# Patient Record
Sex: FEMALE | Race: WHITE | NOT HISPANIC OR LATINO
[De-identification: names, ages, dates, MRNs, and addresses within clinical notes are randomized per-mention and may not be internally consistent; named-entity substitution may affect disease eponyms.]

---

## 2017-06-08 PROBLEM — Z00.00 ENCOUNTER FOR PREVENTIVE HEALTH EXAMINATION: Status: ACTIVE | Noted: 2017-06-08

## 2017-07-18 ENCOUNTER — APPOINTMENT (OUTPATIENT)
Dept: RHEUMATOLOGY | Facility: CLINIC | Age: 72
End: 2017-07-18

## 2017-07-18 VITALS
HEART RATE: 87 BPM | DIASTOLIC BLOOD PRESSURE: 82 MMHG | WEIGHT: 202 LBS | BODY MASS INDEX: 31.71 KG/M2 | OXYGEN SATURATION: 96 % | HEIGHT: 67 IN | SYSTOLIC BLOOD PRESSURE: 138 MMHG | TEMPERATURE: 98 F | RESPIRATION RATE: 18 BRPM

## 2017-07-18 DIAGNOSIS — F17.200 NICOTINE DEPENDENCE, UNSPECIFIED, UNCOMPLICATED: ICD-10-CM

## 2017-07-18 DIAGNOSIS — Z87.442 PERSONAL HISTORY OF URINARY CALCULI: ICD-10-CM

## 2017-07-18 DIAGNOSIS — Z87.19 PERSONAL HISTORY OF OTHER DISEASES OF THE DIGESTIVE SYSTEM: ICD-10-CM

## 2017-07-18 DIAGNOSIS — Z82.49 FAMILY HISTORY OF ISCHEMIC HEART DISEASE AND OTHER DISEASES OF THE CIRCULATORY SYSTEM: ICD-10-CM

## 2017-07-18 DIAGNOSIS — Z86.39 PERSONAL HISTORY OF OTHER ENDOCRINE, NUTRITIONAL AND METABOLIC DISEASE: ICD-10-CM

## 2017-07-18 DIAGNOSIS — K13.79 OTHER LESIONS OF ORAL MUCOSA: ICD-10-CM

## 2017-07-18 DIAGNOSIS — M65.4 RADIAL STYLOID TENOSYNOVITIS [DE QUERVAIN]: ICD-10-CM

## 2017-07-18 DIAGNOSIS — Z87.39 PERSONAL HISTORY OF OTHER DISEASES OF THE MUSCULOSKELETAL SYSTEM AND CONNECTIVE TISSUE: ICD-10-CM

## 2017-07-18 DIAGNOSIS — M25.50 PAIN IN UNSPECIFIED JOINT: ICD-10-CM

## 2017-07-18 DIAGNOSIS — Z82.61 FAMILY HISTORY OF ARTHRITIS: ICD-10-CM

## 2017-08-14 ENCOUNTER — APPOINTMENT (OUTPATIENT)
Dept: INTERNAL MEDICINE | Facility: CLINIC | Age: 72
End: 2017-08-14
Payer: COMMERCIAL

## 2017-08-14 PROCEDURE — 99203 OFFICE O/P NEW LOW 30 MIN: CPT

## 2017-08-21 ENCOUNTER — APPOINTMENT (OUTPATIENT)
Dept: RHEUMATOLOGY | Facility: CLINIC | Age: 72
End: 2017-08-21

## 2017-08-28 ENCOUNTER — APPOINTMENT (OUTPATIENT)
Dept: INTERNAL MEDICINE | Facility: CLINIC | Age: 72
End: 2017-08-28
Payer: COMMERCIAL

## 2017-08-28 PROCEDURE — 99214 OFFICE O/P EST MOD 30 MIN: CPT

## 2021-01-25 ENCOUNTER — APPOINTMENT (OUTPATIENT)
Dept: SURGERY | Facility: CLINIC | Age: 76
End: 2021-01-25
Payer: COMMERCIAL

## 2021-01-25 PROCEDURE — 99213 OFFICE O/P EST LOW 20 MIN: CPT

## 2021-01-25 PROCEDURE — 99072 ADDL SUPL MATRL&STAF TM PHE: CPT

## 2021-07-08 DIAGNOSIS — K57.90 DIVERTICULOSIS OF INTESTINE, PART UNSPECIFIED, W/OUT PERFORATION OR ABSCESS W/OUT BLEEDING: ICD-10-CM

## 2021-07-08 DIAGNOSIS — Z82.49 FAMILY HISTORY OF ISCHEMIC HEART DISEASE AND OTHER DISEASES OF THE CIRCULATORY SYSTEM: ICD-10-CM

## 2021-07-08 DIAGNOSIS — Z87.19 PERSONAL HISTORY OF OTHER DISEASES OF THE DIGESTIVE SYSTEM: ICD-10-CM

## 2021-07-08 DIAGNOSIS — Z82.3 FAMILY HISTORY OF STROKE: ICD-10-CM

## 2021-07-15 ENCOUNTER — APPOINTMENT (OUTPATIENT)
Dept: SURGERY | Facility: CLINIC | Age: 76
End: 2021-07-15
Payer: COMMERCIAL

## 2021-07-15 VITALS
WEIGHT: 200 LBS | SYSTOLIC BLOOD PRESSURE: 125 MMHG | HEIGHT: 67 IN | HEART RATE: 84 BPM | TEMPERATURE: 97.2 F | OXYGEN SATURATION: 93 % | BODY MASS INDEX: 31.39 KG/M2 | DIASTOLIC BLOOD PRESSURE: 74 MMHG

## 2021-07-15 PROCEDURE — 99213 OFFICE O/P EST LOW 20 MIN: CPT

## 2021-07-15 PROCEDURE — 99072 ADDL SUPL MATRL&STAF TM PHE: CPT

## 2021-07-16 NOTE — PHYSICAL EXAM
[de-identified] : Her breasts are dense and fibrocystic.  She has no masses, skin changes, axillary or supraclavicular lymphadenopathy.  Her nipples are everted bilaterally.  Her neck is supple.\par

## 2021-07-16 NOTE — CONSULT LETTER
[Dear  ___] : Dear  [unfilled], [Sincerely,] : Sincerely, [FreeTextEntry1] : I saw Lorraine Aiken in the office today for a breast evaluation.  He is without any complaints in regards to her breasts.  She has strong family story of breast carcinoma in a maternal first cousin.  Medical and surgical history was reviewed.  Her medications were reviewed.  A review of systems was performed and documented.\par \par \par General: No acute distress, conversant, and well-nourished.  Respiratory: Lungs are clear to auscultation with good inspiratory effort.  Cardiovascular: Heart is regular rate and rhythm with no murmurs.  Abdomen: Soft and nontender.  Skin: Good color, turgor, texture with no gross lesions.  Psychiatric: Awake, alert and oriented x3 with an appropriate affect.\par \par Her breasts are dense and fibrocystic.  She has no masses, skin changes, axillary or supraclavicular lymphadenopathy.  Her nipples are everted bilaterally.  Her neck is supple.\par \par Impression: Fibrocystic breasts, family history of breast carcinoma.\par \par Plan: Is a bilateral mammogram and bilateral breast ultrasound for November of this year.  Pending normalcy of those studies and no changes in her breast have asked her to follow-up with me in 6 months for reevaluation. [FreeTextEntry3] : NATE Pan\par

## 2021-12-29 ENCOUNTER — NON-APPOINTMENT (OUTPATIENT)
Age: 76
End: 2021-12-29

## 2021-12-29 ENCOUNTER — APPOINTMENT (OUTPATIENT)
Dept: CARDIOLOGY | Facility: CLINIC | Age: 76
End: 2021-12-29
Payer: COMMERCIAL

## 2021-12-29 VITALS
DIASTOLIC BLOOD PRESSURE: 90 MMHG | OXYGEN SATURATION: 92 % | HEIGHT: 67 IN | SYSTOLIC BLOOD PRESSURE: 170 MMHG | HEART RATE: 80 BPM | WEIGHT: 238 LBS | BODY MASS INDEX: 37.35 KG/M2

## 2021-12-29 DIAGNOSIS — R06.00 DYSPNEA, UNSPECIFIED: ICD-10-CM

## 2021-12-29 DIAGNOSIS — R60.0 LOCALIZED EDEMA: ICD-10-CM

## 2021-12-29 PROCEDURE — 93000 ELECTROCARDIOGRAM COMPLETE: CPT

## 2021-12-29 PROCEDURE — 99204 OFFICE O/P NEW MOD 45 MIN: CPT

## 2021-12-29 NOTE — HISTORY OF PRESENT ILLNESS
[FreeTextEntry1] : Desirae is a 76-year-old female here for initial evaluation.\par \par She reports a history of hyperlipidemia.  She is a longtime smoker, currently smoking about 1 pack/day.\par \par Her main complaint has been lower extremity swelling.  For the last 3 to 4 months, she has noted lower extremity, worse of the right lower extremity.  She was given Lasix 20, and she did not feel like it improved the swelling, though made her urinate too much, so she has since stopped it.  She was recommended to wear compression stockings, though she has not been able to tolerate them.\par \par She has no chest pain, though does report dyspnea on exertion, which she blames on her lungs.  She does report a decrease in exercise tolerance, though is not especially active.  She does have a family history of CAD in her grandparents.  \par \par Of note, she had an echocardiogram in 4/21 which demonstrated mild LVH, trace mitral regurgitation, grade 1 diastolic dysfunction, and normal left ventricular systolic function.  She also had a carotid Doppler which demonstrated 50 to 69% stenosis of her right ICA.  She is currently not taking a statin.

## 2021-12-29 NOTE — PHYSICAL EXAM
[Well Developed] : well developed [Well Nourished] : well nourished [No Acute Distress] : no acute distress [Normal Conjunctiva] : normal conjunctiva [Normal Venous Pressure] : normal venous pressure [No Carotid Bruit] : no carotid bruit [Normal S1, S2] : normal S1, S2 [No Murmur] : no murmur [No Rub] : no rub [No Gallop] : no gallop [Good Air Entry] : good air entry [No Respiratory Distress] : no respiratory distress  [Soft] : abdomen soft [Non Tender] : non-tender [No Masses/organomegaly] : no masses/organomegaly [Normal Bowel Sounds] : normal bowel sounds [Normal Gait] : normal gait [No Cyanosis] : no cyanosis [No Clubbing] : no clubbing [No Varicosities] : no varicosities [No Rash] : no rash [No Skin Lesions] : no skin lesions [Moves all extremities] : moves all extremities [No Focal Deficits] : no focal deficits [Normal Speech] : normal speech [Alert and Oriented] : alert and oriented [Normal memory] : normal memory [de-identified] : + rhonchi on right [de-identified] : edema of her le bilaterall, worse on right

## 2021-12-29 NOTE — DISCUSSION/SUMMARY
[FreeTextEntry1] : Lorraine is a 76-year-old female here for initial evaluation.  She reports dyspnea on exertion, and lower extremity swelling, which has not improved with diuretics.  Her blood pressure is elevated initially, though improved on repeat evaluation.  Her EKG demonstrates a sinus rhythm, with poor R wave progression, though no worrisome findings.\par \par In the setting of her symptoms, we will start with a 2D echocardiogram to evaluate for structural heart disease, and a lower extremity venous Doppler.  She will have a full set of blood work as well.  I have a feeling some of her respiratory symptoms, are related to smoking.\par \par She also had a greater than 50% stenosis of her right ICA, and I will repeat a carotid Doppler.  She is currently not on a statin, though is willing to discuss restarting it after these tests.\par We discussed the importance of smoking cessation, though she is not ready to quit.  I stressed the importance of diet, exercise, weight loss, reduce her overall cardiovascular risk.  We will speak after the above testing, and arrange follow-up.

## 2022-01-03 ENCOUNTER — APPOINTMENT (OUTPATIENT)
Dept: CARDIOLOGY | Facility: CLINIC | Age: 77
End: 2022-01-03
Payer: COMMERCIAL

## 2022-01-03 PROCEDURE — 93880 EXTRACRANIAL BILAT STUDY: CPT

## 2022-01-03 PROCEDURE — 93970 EXTREMITY STUDY: CPT

## 2022-01-05 ENCOUNTER — MED ADMIN CHARGE (OUTPATIENT)
Age: 77
End: 2022-01-05

## 2022-01-05 ENCOUNTER — APPOINTMENT (OUTPATIENT)
Dept: CARDIOLOGY | Facility: CLINIC | Age: 77
End: 2022-01-05
Payer: COMMERCIAL

## 2022-01-05 VITALS
RESPIRATION RATE: 16 BRPM | OXYGEN SATURATION: 91 % | HEIGHT: 67 IN | DIASTOLIC BLOOD PRESSURE: 79 MMHG | HEART RATE: 65 BPM | SYSTOLIC BLOOD PRESSURE: 120 MMHG

## 2022-01-05 PROCEDURE — 93306 TTE W/DOPPLER COMPLETE: CPT

## 2022-01-05 RX ORDER — PERFLUTREN 6.52 MG/ML
6.52 INJECTION, SUSPENSION INTRAVENOUS
Qty: 1 | Refills: 0 | Status: COMPLETED | OUTPATIENT
Start: 2022-01-05

## 2022-01-05 RX ADMIN — PERFLUTREN MG/ML: 6.52 INJECTION, SUSPENSION INTRAVENOUS at 00:00

## 2022-02-28 ENCOUNTER — APPOINTMENT (OUTPATIENT)
Dept: SURGERY | Facility: CLINIC | Age: 77
End: 2022-02-28
Payer: COMMERCIAL

## 2022-02-28 VITALS
BODY MASS INDEX: 31.39 KG/M2 | HEART RATE: 83 BPM | SYSTOLIC BLOOD PRESSURE: 150 MMHG | HEIGHT: 67 IN | TEMPERATURE: 97.7 F | WEIGHT: 200 LBS | OXYGEN SATURATION: 94 % | DIASTOLIC BLOOD PRESSURE: 76 MMHG

## 2022-02-28 PROCEDURE — 99213 OFFICE O/P EST LOW 20 MIN: CPT

## 2022-04-13 ENCOUNTER — APPOINTMENT (OUTPATIENT)
Dept: CARDIOLOGY | Facility: CLINIC | Age: 77
End: 2022-04-13

## 2022-04-13 ENCOUNTER — APPOINTMENT (OUTPATIENT)
Dept: OBGYN | Facility: CLINIC | Age: 77
End: 2022-04-13
Payer: COMMERCIAL

## 2022-04-13 VITALS
SYSTOLIC BLOOD PRESSURE: 122 MMHG | WEIGHT: 223 LBS | HEIGHT: 67 IN | DIASTOLIC BLOOD PRESSURE: 60 MMHG | BODY MASS INDEX: 35 KG/M2

## 2022-04-13 DIAGNOSIS — Z13.820 ENCOUNTER FOR SCREENING FOR OSTEOPOROSIS: ICD-10-CM

## 2022-04-13 DIAGNOSIS — N89.8 OTHER SPECIFIED NONINFLAMMATORY DISORDERS OF VAGINA: ICD-10-CM

## 2022-04-13 PROCEDURE — 99212 OFFICE O/P EST SF 10 MIN: CPT | Mod: 25

## 2022-04-13 PROCEDURE — 99387 INIT PM E/M NEW PAT 65+ YRS: CPT

## 2022-04-13 NOTE — HISTORY OF PRESENT ILLNESS
[Patient reported mammogram was normal] : Patient reported mammogram was normal [Patient reported breast sonogram was normal] : Patient reported breast sonogram was normal [HIV test declined] : HIV test declined [Syphilis test declined] : Syphilis test declined [Gonorrhea test declined] : Gonorrhea test declined [Chlamydia test declined] : Chlamydia test declined [Trichomonas test declined] : Trichomonas test declined [HPV test declined] : HPV test declined [Hepatitis B test declined] : Hepatitis B test declined [Hepatitis C test declined] : Hepatitis C test declined [Men] : men [Vaginal] : vaginal [postmenopausal] : postmenopausal [N] : Patient does not use contraception [Y] : Positive pregnancy history [No] : none [Post-Menopause, No Sxs] : post-menopausal, currently without symptoms [Currently Active] : currently active [TextBox_4] : NEW PT HERE FOR ANNUAL EXAM  [Mammogramdate] : 10/4/21 [BreastSonogramDate] : 11/25/2020 [TextBox_31] : not sure about 4-5 year ago  [LMPDate] : 1997 [PGHxTotal] : 2 [[facility]] : 2 [[facility]Living] : 2 [TextBox_6] : 1997 [TextBox_9] : 15 [TextBox_11] : POST MENOPAUSAL [FreeTextEntry1] : 1997

## 2022-04-13 NOTE — PHYSICAL EXAM
[Chaperone Present] : A chaperone was present in the examining room during all aspects of the physical examination [FreeTextEntry1] : PH [Appropriately responsive] : appropriately responsive [Alert] : alert [No Acute Distress] : no acute distress [No Lymphadenopathy] : no lymphadenopathy [Regular Rate Rhythm] : regular rate rhythm [No Murmurs] : no murmurs [Clear to Auscultation B/L] : clear to auscultation bilaterally [Soft] : soft [Non-tender] : non-tender [Non-distended] : non-distended [No HSM] : No HSM [No Lesions] : no lesions [No Mass] : no mass [Oriented x3] : oriented x3 [Examination Of The Breasts] : a normal appearance [No Masses] : no breast masses were palpable [Labia Majora] : normal [Labia Minora] : normal [Discharge] : a  ~M vaginal discharge was present [Moderate] : moderate [White] : white [Cheesy] : cheesy [Normal] : normal [Uterine Adnexae] : normal [Declined] : Patient declined rectal exam [FreeTextEntry8] : No masses nontender bilaterally

## 2022-04-13 NOTE — PLAN
[FreeTextEntry1] : 77-year-old postmenopausal female, sexually active status post BSO with now normal GYN exam except for monilial vaginitis.  Patient will be treated with fluconazole 150 mg 1 risk-benefit analysis discussed, breast self-exam taught she will have mammogram and sonogram of the breast ordered by Dr. Tomas brought this July, will have bone density study continue vitamin D3 and multivitamin patient also recommended to have colonoscopy by Dr. Mcdaniel patient given referral.  Return in 12 months

## 2022-04-15 LAB — HPV HIGH+LOW RISK DNA PNL CVX: NOT DETECTED

## 2022-04-19 LAB — CYTOLOGY CVX/VAG DOC THIN PREP: ABNORMAL

## 2022-04-29 DIAGNOSIS — R30.0 DYSURIA: ICD-10-CM

## 2022-11-14 ENCOUNTER — APPOINTMENT (OUTPATIENT)
Dept: SURGERY | Facility: CLINIC | Age: 77
End: 2022-11-14

## 2022-11-14 VITALS
TEMPERATURE: 98 F | HEIGHT: 67 IN | HEART RATE: 87 BPM | SYSTOLIC BLOOD PRESSURE: 176 MMHG | BODY MASS INDEX: 35 KG/M2 | OXYGEN SATURATION: 98 % | WEIGHT: 223 LBS | DIASTOLIC BLOOD PRESSURE: 89 MMHG

## 2022-11-14 PROCEDURE — 99213 OFFICE O/P EST LOW 20 MIN: CPT

## 2023-05-18 ENCOUNTER — APPOINTMENT (OUTPATIENT)
Dept: SURGERY | Facility: CLINIC | Age: 78
End: 2023-05-18
Payer: COMMERCIAL

## 2023-05-18 VITALS
HEART RATE: 83 BPM | HEIGHT: 67 IN | OXYGEN SATURATION: 94 % | TEMPERATURE: 97.6 F | BODY MASS INDEX: 31.39 KG/M2 | WEIGHT: 200 LBS | SYSTOLIC BLOOD PRESSURE: 120 MMHG | DIASTOLIC BLOOD PRESSURE: 76 MMHG

## 2023-05-18 PROCEDURE — 99213 OFFICE O/P EST LOW 20 MIN: CPT

## 2023-05-19 NOTE — CONSULT LETTER
[Dear  ___] : Dear  [unfilled], [Sincerely,] : Sincerely, [DrArian  ___] : Dr. MONTOYA [FreeTextEntry1] : I saw Lorraine Aiken in the office today for a breast evaluation.  She is without any complaints in regards to her breasts.  She had a benign bilateral mammogram and bilateral breast ultrasound performed on December 7, 2022.  She has strong family story of breast carcinoma in a maternal first cousin.  Her medical and surgical history was reviewed.  Her medications were reviewed.  A review of systems was performed and documented.\par \par General: No acute distress, conversant, and well-nourished.  Respiratory: Lungs are clear to auscultation with good inspiratory effort.  Cardiovascular: Heart is regular rate and rhythm with no murmurs.  Abdomen: Soft and nontender.  Skin: Good color, turgor, texture with no gross lesions.  Psychiatric: Awake, alert and oriented x3 with an appropriate affect.\par \par Her breasts are dense and fibrocystic.  She has no masses, skin changes, axillary or supraclavicular lymphadenopathy.  Her nipples are everted bilaterally.  Her neck is supple.\par \par Impression: Fibrocystic breasts.  Family history of breast carcinoma.  Benign bilateral mammogram and bilateral breast ultrasound December 7, 2022.\par \par Plan: Bilateral mammogram and bilateral breast ultrasound in December.  Follow-up after the studies are performed. [FreeTextEntry3] : Moreno Harmon D.O., ELENI, FACS\par , Surgery [facility]\par  Surgery [facility]\par

## 2023-06-14 ENCOUNTER — APPOINTMENT (OUTPATIENT)
Dept: THORACIC SURGERY | Facility: CLINIC | Age: 78
End: 2023-06-14
Payer: COMMERCIAL

## 2023-06-14 VITALS
OXYGEN SATURATION: 94 % | HEART RATE: 78 BPM | HEIGHT: 67 IN | DIASTOLIC BLOOD PRESSURE: 76 MMHG | WEIGHT: 202 LBS | SYSTOLIC BLOOD PRESSURE: 133 MMHG | RESPIRATION RATE: 16 BRPM | BODY MASS INDEX: 31.71 KG/M2

## 2023-06-14 PROCEDURE — 99205 OFFICE O/P NEW HI 60 MIN: CPT

## 2023-06-14 RX ORDER — TORSEMIDE 10 MG/1
10 TABLET ORAL DAILY
Qty: 30 | Refills: 0 | Status: DISCONTINUED | COMMUNITY
Start: 2022-01-11 | End: 2023-06-14

## 2023-06-14 RX ORDER — CEPHALEXIN 500 MG/1
500 CAPSULE ORAL TWICE DAILY
Qty: 14 | Refills: 0 | Status: DISCONTINUED | COMMUNITY
Start: 2022-04-29 | End: 2023-06-14

## 2023-06-14 RX ORDER — FLUCONAZOLE 150 MG/1
150 TABLET ORAL
Qty: 1 | Refills: 0 | Status: DISCONTINUED | COMMUNITY
Start: 2022-04-13 | End: 2023-06-14

## 2023-06-17 NOTE — HISTORY OF PRESENT ILLNESS
[FreeTextEntry1] : Ms. MONI BENAVIDEZ, 78 year old female, Current smoker (1 PPD x 50 years); started cessation treatment June, 2023, w/ hx of HTN, diverticular disease, osteoporosis. Recent CT revealing multiple lung nodule.\par \par CT Chest on 05/16/2023 ([facility]):\par - There is a slightly lobulated irregularly shaped spiculated nodule in the apical segment of the right upper lobe on series 5 image 94, series 6 image 43. It measures 1.5 x 1.9 x 1.7 cm. \par - More superiorly in the posterior segment there is a ill-defined nodular density measuring approximately approximately 6 x 10 mm (series 5 image 67, series 6 image 59).\par - A similar appearing groundglass nodular density in the medial left apex on image 55 is noted that measures approximately 9 mm period\par - Smaller groundglass nodular density in the anterior right upper lobe on image 88 and scattered areas of moderate linear subsegmental atelectasis and parenchymal markings that are most likely inflammatory in the anterior segment right upper lobe, medial segment right middle lobe, anterior segment left upper lobe and lingula, and in the medial and anterior basilar segments of both lower lobes.\par - Some of the opacities have air bronchograms and others are associated with adjacent mild groundglass predominantly in the right middle and lower lobes. These could be reevaluated on short interval follow up to assure clearing.\par - There is diffuse mild bronchial airways wall thickening predominantly in the small airways in the upper and lower lung zones and a short segment of complete airway opacification along a right upper lobe airway is noted (for example image 87). \par - There is also partial opacification versus an endoluminal nodule in the right lower lobe lateral and posterior basilar segment airways (images 198 and 205) most likely representing additional endoluminal retained or aspirated\par secretions.\par - Mildly enlarged right axillary node measures approximately 1.5 x 1.5 cm on series 2 image 25. Smaller shotty bilateral axillary nodes are noted. \par - There are numerous shotty mediastinal nodes measuring up to approximately 8 mm short axis dimension in the precarinal and subcarinal locations. \par \par PET/CT on 06/05/2023 ([facility]):\par - There is an intensely FDG avid right upper lobe mass (SUV 9.3, 1.6 x 1.9 cm, series 3 image 79). This is suspicious for malignancy. \par - There are atelectatic changes in the anterior right upper lobe and the lingula without corresponding FDG activity. \par - There is an FDG avid right axillary lymph node (SUV 1.9, 1.5 cm, series 3 image 74), likely reactive in nature.\par \par Patient is here today for CT Sx consultation, self referred. + Dyspnea upon moderate exertion; + cough Denies worsening SOB, chest pain, hemoptysis, fever, chills, night sweats, lightheadedness or dizziness.\par \par

## 2023-06-17 NOTE — ASSESSMENT
[FreeTextEntry1] : Ms. MONI BENAVIDEZ, 78 year old female, Current smoker (1 PPD x 50 years); started cessation treatment June, 2023, w/ hx of HTN, diverticular disease, osteoporosis. Recent CT revealing multiple lung nodule.\par \par I have independently reviewed the medical records and imaging at the time of this office consultation. Intensely FDG avid right upper lobe mass w/ no other concerning areas of uptake on PET. Suspicious for an early stage primary lung cancer. Discussed surgical resection for diagnostic and therapeutic purposes. Will require a right upper lobectomy given that the lesion is greater than 2cm and more centrally located. Baseline pulmonary function reviewed and within acceptable limits. Risks, benefits and alternatives explained to patient; All questions answered and she agrees to proceed with surgery. Will obtain Brain MRI w/wo contrast to complete radiologic staging workup. Follow up in 2 weeks, via telehealth, to discuss results. In the interim, will book for Right VATS, robotic assisted right upper lobectomy at [facility]. Medical clearance required prior to procedure. Continue with smoking cessation. Discussed periop risks associated with actively smoking. \par \par Recommendations reviewed with patient during this office visit, and all questions answered; Patient instructed on the importance of follow up and verbalizes understanding.\par \par I, KERI Palacio, personally performed the evaluation and management (E/M) services for this new patient. That E/M includes conducting the initial examination, assessing all conditions, and establishing the plan of care. Today, My ACP, Nelli Holder, was here to observe my evaluation and management services for this patient to be followed going forward.\par \par \par

## 2023-06-17 NOTE — DATA REVIEWED
[FreeTextEntry1] : I have independently reviewed the following:\par CT Chest on 05/16/2023\par PET/CT on 06/05/2023

## 2023-06-17 NOTE — CONSULT LETTER
[Consult Letter:] : I had the pleasure of evaluating your patient, [unfilled]. [( Thank you for referring [unfilled] for consultation for _____ )] : Thank you for referring [unfilled] for consultation for [unfilled] [Please see my note below.] : Please see my note below. [Consult Closing:] : Thank you very much for allowing me to participate in the care of this patient.  If you have any questions, please do not hesitate to contact me. [Sincerely,] : Sincerely, [FreeTextEntry2] : self referred [FreeTextEntry3] : Matt Mario MD, FACS\par , Division of Thoracic Surgery\par [facility]\par Thoracic Surgery\par [facility]\par Department of Cardiovascular & Thoracic Surgery\par \par [facility]

## 2023-06-17 NOTE — PHYSICAL EXAM
[Restricted in physically strenuous activity but ambulatory and able to carry out work of a light or sedentary nature] : Status 1- Restricted in physically strenuous activity but ambulatory and able to carry out work of a light or sedentary nature, e.g., light house work, office work [General Appearance - Alert] : alert [General Appearance - In No Acute Distress] : in no acute distress [General Appearance - Well Nourished] : well nourished [Sclera] : the sclera and conjunctiva were normal [Extraocular Movements] : extraocular movements were intact [Outer Ear] : the ears and nose were normal in appearance [Both Tympanic Membranes Were Examined] : both tympanic membranes were normal [Hearing Threshold Finger Rub Not [place]] : hearing was normal [Neck Appearance] : the appearance of the neck was normal [Neck Cervical Mass (___cm)] : no neck mass was observed [] : no respiratory distress [Respiration, Rhythm And Depth] : normal respiratory rhythm and effort [Exaggerated Use Of Accessory Muscles For Inspiration] : no accessory muscle use [Auscultation Breath Sounds / Voice Sounds] : lungs were clear to auscultation bilaterally [Heart Rate And Rhythm] : heart rate was normal and rhythm regular [Examination Of The Chest] : the chest was normal in appearance [Diminished Respiratory Excursion] : normal chest expansion [Chest Visual Inspection Thoracic Asymmetry] : no chest asymmetry [2+] : left 2+ [Breast Appearance] : normal in appearance [Breast Palpation Mass] : no palpable masses [Bowel Sounds] : normal bowel sounds [Abdomen Soft] : soft [Abdomen Tenderness] : non-tender [Cervical Lymph Nodes Enlarged Posterior Bilaterally] : posterior cervical [Cervical Lymph Nodes Enlarged Anterior Bilaterally] : anterior cervical [Supraclavicular Lymph Nodes Enlarged Bilaterally] : supraclavicular [No CVA Tenderness] : no ~M costovertebral angle tenderness [No Spinal Tenderness] : no spinal tenderness [Involuntary Movements] : no involuntary movements were seen [No Focal Deficits] : no focal deficits [Skin Color & Pigmentation] : normal skin color and pigmentation [Oriented To Time, Place, And Person] : oriented to person, place, and time [FreeTextEntry1] : Deferred

## 2023-06-23 ENCOUNTER — NON-APPOINTMENT (OUTPATIENT)
Age: 78
End: 2023-06-23

## 2023-06-23 ENCOUNTER — APPOINTMENT (OUTPATIENT)
Dept: CARDIOLOGY | Facility: CLINIC | Age: 78
End: 2023-06-23
Payer: COMMERCIAL

## 2023-06-23 VITALS
BODY MASS INDEX: 32.96 KG/M2 | SYSTOLIC BLOOD PRESSURE: 129 MMHG | WEIGHT: 210 LBS | HEIGHT: 67 IN | HEART RATE: 76 BPM | OXYGEN SATURATION: 97 % | DIASTOLIC BLOOD PRESSURE: 73 MMHG

## 2023-06-23 DIAGNOSIS — Z01.810 ENCOUNTER FOR PREPROCEDURAL CARDIOVASCULAR EXAMINATION: ICD-10-CM

## 2023-06-23 DIAGNOSIS — I65.29 OCCLUSION AND STENOSIS OF UNSPECIFIED CAROTID ARTERY: ICD-10-CM

## 2023-06-23 DIAGNOSIS — E78.5 HYPERLIPIDEMIA, UNSPECIFIED: ICD-10-CM

## 2023-06-23 PROCEDURE — 99214 OFFICE O/P EST MOD 30 MIN: CPT | Mod: 25

## 2023-06-23 PROCEDURE — 93000 ELECTROCARDIOGRAM COMPLETE: CPT | Mod: NC

## 2023-06-29 ENCOUNTER — APPOINTMENT (OUTPATIENT)
Dept: CARDIOLOGY | Facility: CLINIC | Age: 78
End: 2023-06-29
Payer: COMMERCIAL

## 2023-06-29 PROCEDURE — 93306 TTE W/DOPPLER COMPLETE: CPT

## 2023-06-29 PROCEDURE — 93880 EXTRACRANIAL BILAT STUDY: CPT

## 2023-07-03 NOTE — HISTORY OF PRESENT ILLNESS
[FreeTextEntry1] : Desirae is a 78-year-old female here for a presurgical cardiac evaluation. She was last seen in the office by Dr Camejo on 12/29/21.\par  She is scheduled for a Right VATS, robotic assisted right upper lobectomy by Dr Matt Mario on 7/17/23 at [facility].\par She reports a history of hyperlipidemia.   She is a longtime smoker, currently smoking about 1 pack/day. She recently has been cutting down to less than a pack a day on Chantix.\par \par At her last visit, her main complaint had been lower extremity swelling which has resolved.    She was recommended to wear compression stockings, though she has not been able to tolerate them.\par \par She has no chest pain, though does report dyspnea on exertion, which she blames on her lungs.  She does report a decrease in exercise tolerance, though is not especially active.  She does have a family history of CAD in her grandparents.  \par \par Of note, she had an echocardiogram in 4/21 which demonstrated mild LVH, trace mitral regurgitation, grade 1 diastolic dysfunction, and normal left ventricular systolic function.  She also had a carotid Doppler which demonstrated 50 to 69% stenosis of her right ICA.  She is currently not taking a statin.She states she gets leg cramps from the statins. Her blood pressure today was 129/73 with a heart rate of 76.

## 2023-07-03 NOTE — PHYSICAL EXAM
[Well Developed] : well developed [Well Nourished] : well nourished [No Acute Distress] : no acute distress [Normal Conjunctiva] : normal conjunctiva [Normal Venous Pressure] : normal venous pressure [No Carotid Bruit] : no carotid bruit [Normal S1, S2] : normal S1, S2 [No Murmur] : no murmur [No Rub] : no rub [No Gallop] : no gallop [Good Air Entry] : good air entry [No Respiratory Distress] : no respiratory distress  [Soft] : abdomen soft [Non Tender] : non-tender [No Masses/organomegaly] : no masses/organomegaly [Normal Bowel Sounds] : normal bowel sounds [Normal Gait] : normal gait [No Cyanosis] : no cyanosis [No Clubbing] : no clubbing [No Varicosities] : no varicosities [No Rash] : no rash [No Skin Lesions] : no skin lesions [Moves all extremities] : moves all extremities [No Focal Deficits] : no focal deficits [Normal Speech] : normal speech [Alert and Oriented] : alert and oriented [Normal memory] : normal memory [Normal Radial B/L] : normal radial B/L [Normal PT B/L] : normal PT B/L [Normal DP B/L] : normal DP B/L [de-identified] : + rhonchi on right

## 2023-07-03 NOTE — REVIEW OF SYSTEMS
[Negative] : Heme/Lymph [Dyspnea on exertion] : not dyspnea during exertion [Lower Ext Edema] : no extremity edema [FreeTextEntry5] : mild SOB with exertion

## 2023-07-03 NOTE — DISCUSSION/SUMMARY
[EKG obtained to assist in diagnosis and management of assessed problem(s)] : EKG obtained to assist in diagnosis and management of assessed problem(s) [FreeTextEntry1] : Lorraine is a 78-year-old female here for pre surgical cardiac evaluation.\par   She reports dyspnea on exertion, noting she has been smoking cigarettes - one pack armin for 60 years.\par She di have lower extremity swelling which has resolved.   Her blood pressure today is 129/73 with a heart rate of 76 Her EKG demonstrates a sinus rhythm, with poor R wave progression, though no worrisome findings.\par  She is scheduled for a Right VATS, robotic assisted right upper lobectomy by Dr Matt Mario on 7/17/23 at [facility].\par In the setting of her upcoming surgery, I am referring the patient for an echocardiogram to reassess cardiac structural integrity, left ventricular function, valvular function and the pulmonary artery systolic pressure.  In addition, I have ordered a carotid artery Doppler test to be performed to reassess the degree of carotid atherosclerosis formation.  She will make these arrangements for the studies to be performed through our office and I will telephone her to discuss the findings once the studies have been completed.  I will also add an addendum to the bottom of this note after the studies have been performed for cardiac clearance\par  \par On the previous carotid Doppler, she also had a greater than 50% stenosis of her right ICA, and I will compare that to the Carotid Doppler I just ordered preoperatively.   She is currently not on a statin, due to leg pain while taking a statin. She is willing to try Zetia 10mg along with COQ10. I will have the lipid panel and liver profile drawn in approximately 6 weeks.\par We discussed the importance of continued smoking cessation,   and stressed the importance of diet, exercise, and weight loss, to reduce her overall cardiovascular risk.  We will speak after the above testing, and further recommendations will depend on her clinical course.\par \par ADDENDUM 7/3/23\par An echocardiogram performed June 29, 2023 revealed calcification of the mitral valve annulus.  There was mild calcification of the aortic valve leaflets.  The left ventricular systolic function is normal with an ejection fraction of 58%.  There is mild grade 1 left ventricular diastolic dysfunction.  There was normal pulmonary artery pressure noted.  Compared to the transthoracic echocardiogram performed on 1/5/2022, there have been no significant interval changes.\par A carotid Doppler performed 6/29/23 revealed mild to moderate atherosclerosis without evidence of hemodynamically significant stenosis bilaterally. Again noted, multiple right thyroid nodules. Compared to previous report performed on 1/3/22, no significant changes are noted.\par \par From the perspective of preop management, I consider her optimized from a cardiovascular perspective for her planned procedure. Routine hemodynamic monitoring is recommended.

## 2023-07-05 ENCOUNTER — APPOINTMENT (OUTPATIENT)
Dept: THORACIC SURGERY | Facility: CLINIC | Age: 78
End: 2023-07-05
Payer: COMMERCIAL

## 2023-07-05 PROCEDURE — 99443: CPT

## 2023-07-05 NOTE — REASON FOR VISIT
[Follow-Up: _____] : a [unfilled] follow-up visit [Home] : at home, [unfilled] , at the time of the visit. [Medical Office: ([facility])___] : at the medical office located in  [Verbal consent obtained from patient] : the patient, [unfilled] [Spouse] : spouse

## 2023-07-07 NOTE — DATA REVIEWED
[FreeTextEntry1] : I have independently reviewed the following:\par MRI Brain w/wo Contrast on 06/28/2023

## 2023-07-07 NOTE — HISTORY OF PRESENT ILLNESS
[FreeTextEntry1] : Ms. MONI BENAVIDEZ, 78 year old female, Current smoker (1 PPD x 50 years); started cessation treatment June, 2023, now down to 2 cigs daily. On Varenicline. Pmhx of HTN, diverticular disease, osteoporosis. Recent CT revealing multiple lung nodule.\par \par CT Chest on 05/16/2023 ([facility]):\par - There is a slightly lobulated irregularly shaped spiculated nodule in the apical segment of the right upper lobe on series 5 image 94, series 6 image 43. It measures 1.5 x 1.9 x 1.7 cm. \par - More superiorly in the posterior segment there is a ill-defined nodular density measuring approximately approximately 6 x 10 mm (series 5 image 67, series 6 image 59).\par - A similar appearing groundglass nodular density in the medial left apex on image 55 is noted that measures approximately 9 mm period\par - Smaller groundglass nodular density in the anterior right upper lobe on image 88 and scattered areas of moderate linear subsegmental atelectasis and parenchymal markings that are most likely inflammatory in the anterior segment right upper lobe, medial segment right middle lobe, anterior segment left upper lobe and lingula, and in the medial and anterior basilar segments of both lower lobes.\par - Some of the opacities have air bronchograms and others are associated with adjacent mild groundglass predominantly in the right middle and lower lobes. These could be reevaluated on short interval follow up to assure clearing.\par - There is diffuse mild bronchial airways wall thickening predominantly in the small airways in the upper and lower lung zones and a short segment of complete airway opacification along a right upper lobe airway is noted (for example image 87). \par - There is also partial opacification versus an endoluminal nodule in the right lower lobe lateral and posterior basilar segment airways (images 198 and 205) most likely representing additional endoluminal retained or aspirated\par secretions.\par - Mildly enlarged right axillary node measures approximately 1.5 x 1.5 cm on series 2 image 25. Smaller shotty bilateral axillary nodes are noted. \par - There are numerous shotty mediastinal nodes measuring up to approximately 8 mm short axis dimension in the precarinal and subcarinal locations. \par \par PET/CT on 06/05/2023 ([facility]):\par - There is an intensely FDG avid right upper lobe mass (SUV 9.3, 1.6 x 1.9 cm, series 3 image 79). This is suspicious for malignancy. \par - There are atelectatic changes in the anterior right upper lobe and the lingula without corresponding FDG activity. \par - There is an FDG avid right axillary lymph node (SUV 1.9, 1.5 cm, series 3 image 74), likely reactive in nature.\par \par Seen on 06/14/2023: Intensely FDG avid right upper lobe mass w/ no other concerning areas of uptake on PET. Suspicious for an early stage primary lung cancer. Discussed surgical resection for diagnostic and therapeutic purposes. Will require a right upper lobectomy given that the lesion is greater than 2 cm and more centrally located. She agrees to proceed with surgery. Will obtain Brain MRI w/wo contrast to complete radiologic staging workup. Follow up in 2 weeks, via telehealth, to discuss results. In the interim, will book for Right VATS, robotic assisted right upper lobectomy at [facility]. Medical clearance required prior to procedure. Continue with smoking cessation. Discussed periop risks associated with actively smoking. 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 Scheduled for surgery on 7/17. Cardiac Clearance obtained on 6/23. Today, patient denies chest pain,  hemoptysis, fever, chills, night sweats, lightheadedness or dizziness.\par \par

## 2023-07-07 NOTE — ASSESSMENT
[FreeTextEntry1] : Ms. MONI BENAVIDEZ, 78 year old female, Current smoker (1 PPD x 50 years); started cessation treatment June, 2023, now down to 2 cigs daily. On Varenicline. Pmhx of HTN, diverticular disease, osteoporosis. Recent CT revealing multiple lung nodule.\par \par She is here today for follow up,  via tele visit, to discuss results of Brain MRI. \par \par I have independently reviewed the medical records and imaging at the time of this office consultation. MRI with no evidence of metastatic disease. Additionally, patient has underwent a pre op cardiac workup and was cleared for upcoming procedure. Will proceed with Robotic Assisted Right Upper Lobectomy on 7/17. She is agreeable. Periop process further reviewed with patient. R/B/A all discussed again in detail. \par \par Recommendations reviewed with patient during this office visit, and all questions answered; Patient instructed on the importance of follow up and verbalizes understanding.\par \par I, KERI Palacio, personally performed the evaluation and management (E/M) services for this established patient. That E/M includes assessing all new/exacerbated conditions, and establishing a new plan of care. Today, My ACP, Nelli Holder, was here to observe my evaluation and management services for this patient to be followed going forward.\par

## 2023-07-07 NOTE — CONSULT LETTER
[Consult Letter:] : I had the pleasure of evaluating your patient, [unfilled]. [( Thank you for referring [unfilled] for consultation for _____ )] : Thank you for referring [unfilled] for consultation for [unfilled] [Please see my note below.] : Please see my note below. [Consult Closing:] : Thank you very much for allowing me to participate in the care of this patient.  If you have any questions, please do not hesitate to contact me. [Sincerely,] : Sincerely, [FreeTextEntry2] : self referred [FreeTextEntry3] : Matt Mario MD, FACS\par , Division of Thoracic Surgery\par [facility]\par Thoracic Surgery\par [facility]\par Department of Cardiovascular & Thoracic Surgery\par \par [facility] [employer] [facility]

## 2023-07-10 ENCOUNTER — OUTPATIENT (OUTPATIENT)
Dept: OUTPATIENT SERVICES | Facility: HOSPITAL | Age: 78
LOS: 1 days | End: 2023-07-10

## 2023-07-10 VITALS
OXYGEN SATURATION: 96 % | RESPIRATION RATE: 16 BRPM | SYSTOLIC BLOOD PRESSURE: 133 MMHG | WEIGHT: 207.9 LBS | TEMPERATURE: 98 F | DIASTOLIC BLOOD PRESSURE: 67 MMHG | HEIGHT: 66 IN | HEART RATE: 79 BPM

## 2023-07-10 DIAGNOSIS — Z90.722 ACQUIRED ABSENCE OF OVARIES, BILATERAL: Chronic | ICD-10-CM

## 2023-07-10 DIAGNOSIS — R91.1 SOLITARY PULMONARY NODULE: ICD-10-CM

## 2023-07-10 DIAGNOSIS — Z87.891 PERSONAL HISTORY OF NICOTINE DEPENDENCE: Chronic | ICD-10-CM

## 2023-07-10 DIAGNOSIS — E66.9 OBESITY, UNSPECIFIED: Chronic | ICD-10-CM

## 2023-07-10 DIAGNOSIS — Z90.49 ACQUIRED ABSENCE OF OTHER SPECIFIED PARTS OF DIGESTIVE TRACT: Chronic | ICD-10-CM

## 2023-07-10 DIAGNOSIS — D75.1 SECONDARY POLYCYTHEMIA: ICD-10-CM

## 2023-07-10 LAB
ALBUMIN SERPL ELPH-MCNC: 4.3 G/DL — SIGNIFICANT CHANGE UP (ref 3.3–5)
ALP SERPL-CCNC: 77 U/L — SIGNIFICANT CHANGE UP (ref 40–120)
ALT FLD-CCNC: 27 U/L — SIGNIFICANT CHANGE UP (ref 4–33)
ANION GAP SERPL CALC-SCNC: 13 MMOL/L — SIGNIFICANT CHANGE UP (ref 7–14)
AST SERPL-CCNC: 19 U/L — SIGNIFICANT CHANGE UP (ref 4–32)
BILIRUB SERPL-MCNC: 0.3 MG/DL — SIGNIFICANT CHANGE UP (ref 0.2–1.2)
BLD GP AB SCN SERPL QL: NEGATIVE — SIGNIFICANT CHANGE UP
BUN SERPL-MCNC: 16 MG/DL — SIGNIFICANT CHANGE UP (ref 7–23)
CALCIUM SERPL-MCNC: 9.5 MG/DL — SIGNIFICANT CHANGE UP (ref 8.4–10.5)
CHLORIDE SERPL-SCNC: 97 MMOL/L — LOW (ref 98–107)
CO2 SERPL-SCNC: 25 MMOL/L — SIGNIFICANT CHANGE UP (ref 22–31)
CREAT SERPL-MCNC: 0.57 MG/DL — SIGNIFICANT CHANGE UP (ref 0.5–1.3)
EGFR: 94 ML/MIN/1.73M2 — SIGNIFICANT CHANGE UP
GLUCOSE SERPL-MCNC: 80 MG/DL — SIGNIFICANT CHANGE UP (ref 70–99)
HCT VFR BLD CALC: 48.7 % — HIGH (ref 34.5–45)
HGB BLD-MCNC: 16.3 G/DL — HIGH (ref 11.5–15.5)
MCHC RBC-ENTMCNC: 31.3 PG — SIGNIFICANT CHANGE UP (ref 27–34)
MCHC RBC-ENTMCNC: 33.5 GM/DL — SIGNIFICANT CHANGE UP (ref 32–36)
MCV RBC AUTO: 93.7 FL — SIGNIFICANT CHANGE UP (ref 80–100)
NRBC # BLD: 0 /100 WBCS — SIGNIFICANT CHANGE UP (ref 0–0)
NRBC # FLD: 0 K/UL — SIGNIFICANT CHANGE UP (ref 0–0)
PLATELET # BLD AUTO: 279 K/UL — SIGNIFICANT CHANGE UP (ref 150–400)
POTASSIUM SERPL-MCNC: 4.7 MMOL/L — SIGNIFICANT CHANGE UP (ref 3.5–5.3)
POTASSIUM SERPL-SCNC: 4.7 MMOL/L — SIGNIFICANT CHANGE UP (ref 3.5–5.3)
PROT SERPL-MCNC: 7 G/DL — SIGNIFICANT CHANGE UP (ref 6–8.3)
RBC # BLD: 5.2 M/UL — SIGNIFICANT CHANGE UP (ref 3.8–5.2)
RBC # FLD: 11.9 % — SIGNIFICANT CHANGE UP (ref 10.3–14.5)
RH IG SCN BLD-IMP: POSITIVE — SIGNIFICANT CHANGE UP
SODIUM SERPL-SCNC: 135 MMOL/L — SIGNIFICANT CHANGE UP (ref 135–145)
WBC # BLD: 9.45 K/UL — SIGNIFICANT CHANGE UP (ref 3.8–10.5)
WBC # FLD AUTO: 9.45 K/UL — SIGNIFICANT CHANGE UP (ref 3.8–10.5)

## 2023-07-10 RX ORDER — SODIUM CHLORIDE 9 MG/ML
1000 INJECTION, SOLUTION INTRAVENOUS
Refills: 0 | Status: DISCONTINUED | OUTPATIENT
Start: 2023-07-17 | End: 2023-07-18

## 2023-07-10 NOTE — H&P PST ADULT - REASON FOR ADMISSION
How Severe Is It?: moderate Is This A New Presentation, Or A Follow-Up?: Rash Solitary pulmonary nodule

## 2023-07-10 NOTE — H&P PST ADULT - NSICDXPASTSURGICALHX_GEN_ALL_CORE_FT
PAST SURGICAL HISTORY:  H/O bilateral oophorectomy     History of cholecystectomy     Obesity     Smoking history

## 2023-07-10 NOTE — H&P PST ADULT - PROBLEM SELECTOR PLAN 1
Pt scheduled for surgery and preop instructions including instructions for taking Famotidine and for Chlorhexidine use in showering on the day of surgery, given verbally and with use of  written materials, and patient confirming understanding of such instructions using  teach back method.  CC in chart from Cardio

## 2023-07-10 NOTE — H&P PST ADULT - HISTORY OF PRESENT ILLNESS
Pt is a 78 yr old female scheduled for Right Video Assisted Thoracoscopic Surgery Robotic Assisted Right Upper Lobectomy with Dr Mario tentatively 7/17/23 -  Pt is a 78 yr old female scheduled for Right Video Assisted Thoracoscopic Surgery Robotic Assisted Right Upper Lobectomy with Dr Mario tentatively 7/17/23 - pt hx polycythemia , smoking - CT scan noted pulmonary nodule - pt denies cough or SOB

## 2023-07-10 NOTE — H&P PST ADULT - NSICDXPASTMEDICALHX_GEN_ALL_CORE_FT
PAST MEDICAL HISTORY:  Pulmonary nodule      PAST MEDICAL HISTORY:  H/O polycythemia     Obesity     Pulmonary nodule     Smoking history

## 2023-07-10 NOTE — H&P PST ADULT - NS PRO FEM REPRO PAP RESULTS
· Ultrasound showed "Large complex right hydrocele containing numerous septations  Increased vascularity in the left testicle with respect to the right, although both testicles could not be imaged on the same plane limiting direct comparison   Correlate clinically for orchitis "  · Urology saw patient 10/29/2019 and recommends no surgical intervention acutely and to manage problems with legs and get therapy first   · beta HCG, AFP and LDH within normal limits  · Supportive care  normal

## 2023-07-10 NOTE — H&P PST ADULT - RESPIRATORY AND THORAX COMMENTS
Smoking history - CT scan done for screening - PET scan done Smoking history - CT scan done for screening - PET scan done and pulmonary nodules noted - pt denies SOB or cough

## 2023-07-17 ENCOUNTER — TRANSCRIPTION ENCOUNTER (OUTPATIENT)
Age: 78
End: 2023-07-17

## 2023-07-17 ENCOUNTER — INPATIENT (INPATIENT)
Facility: HOSPITAL | Age: 78
LOS: 4 days | Discharge: HOME | End: 2023-07-22
Attending: THORACIC SURGERY (CARDIOTHORACIC VASCULAR SURGERY) | Admitting: THORACIC SURGERY (CARDIOTHORACIC VASCULAR SURGERY)
Payer: COMMERCIAL

## 2023-07-17 ENCOUNTER — APPOINTMENT (OUTPATIENT)
Dept: THORACIC SURGERY | Facility: HOSPITAL | Age: 78
End: 2023-07-17

## 2023-07-17 ENCOUNTER — RESULT REVIEW (OUTPATIENT)
Age: 78
End: 2023-07-17

## 2023-07-17 VITALS
SYSTOLIC BLOOD PRESSURE: 129 MMHG | DIASTOLIC BLOOD PRESSURE: 59 MMHG | HEART RATE: 77 BPM | TEMPERATURE: 98 F | OXYGEN SATURATION: 99 % | RESPIRATION RATE: 18 BRPM | HEIGHT: 66 IN | WEIGHT: 207.9 LBS

## 2023-07-17 DIAGNOSIS — Z87.891 PERSONAL HISTORY OF NICOTINE DEPENDENCE: Chronic | ICD-10-CM

## 2023-07-17 DIAGNOSIS — R91.1 SOLITARY PULMONARY NODULE: ICD-10-CM

## 2023-07-17 DIAGNOSIS — Z90.49 ACQUIRED ABSENCE OF OTHER SPECIFIED PARTS OF DIGESTIVE TRACT: Chronic | ICD-10-CM

## 2023-07-17 DIAGNOSIS — Z90.722 ACQUIRED ABSENCE OF OVARIES, BILATERAL: Chronic | ICD-10-CM

## 2023-07-17 DIAGNOSIS — E66.9 OBESITY, UNSPECIFIED: Chronic | ICD-10-CM

## 2023-07-17 PROCEDURE — 99232 SBSQ HOSP IP/OBS MODERATE 35: CPT

## 2023-07-17 PROCEDURE — 88309 TISSUE EXAM BY PATHOLOGIST: CPT | Mod: 26

## 2023-07-17 PROCEDURE — 88305 TISSUE EXAM BY PATHOLOGIST: CPT | Mod: 26

## 2023-07-17 PROCEDURE — 71045 X-RAY EXAM CHEST 1 VIEW: CPT | Mod: 26

## 2023-07-17 PROCEDURE — S2900 ROBOTIC SURGICAL SYSTEM: CPT | Mod: NC

## 2023-07-17 PROCEDURE — 32674 THORACOSCOPY LYMPH NODE EXC: CPT

## 2023-07-17 PROCEDURE — 32663 THORACOSCOPY W/LOBECTOMY: CPT

## 2023-07-17 DEVICE — CLIP LIGATING VESOLOCK MED/LG GRN: Type: IMPLANTABLE DEVICE | Site: RIGHT | Status: FUNCTIONAL

## 2023-07-17 DEVICE — CHEST DRAIN THORACIC ARGYLE PVC 24FR STRAIGHT: Type: IMPLANTABLE DEVICE | Site: RIGHT | Status: FUNCTIONAL

## 2023-07-17 DEVICE — LIGATING CLIPS WECK HORIZON SMALL-WIDE (RED) 24: Type: IMPLANTABLE DEVICE | Site: RIGHT | Status: FUNCTIONAL

## 2023-07-17 DEVICE — STAPLER COVIDIEN TRI-STAPLE CURVED 30MM TAN RELOAD: Type: IMPLANTABLE DEVICE | Site: RIGHT | Status: FUNCTIONAL

## 2023-07-17 DEVICE — STAPLER COVIDIEN TRI-STAPLE 45MM PURPLE RELOAD: Type: IMPLANTABLE DEVICE | Site: RIGHT | Status: FUNCTIONAL

## 2023-07-17 DEVICE — STAPLER COVIDIEN TRI-STAPLE CURVED 45MM PURPLE RELOAD: Type: IMPLANTABLE DEVICE | Site: RIGHT | Status: FUNCTIONAL

## 2023-07-17 DEVICE — STAPLER COVIDIEN TRI-STAPLE 60MM PURPLE RELOAD: Type: IMPLANTABLE DEVICE | Site: RIGHT | Status: FUNCTIONAL

## 2023-07-17 DEVICE — SURGICEL 2 X 14": Type: IMPLANTABLE DEVICE | Site: RIGHT | Status: FUNCTIONAL

## 2023-07-17 RX ORDER — ONDANSETRON 8 MG/1
4 TABLET, FILM COATED ORAL EVERY 6 HOURS
Refills: 0 | Status: DISCONTINUED | OUTPATIENT
Start: 2023-07-17 | End: 2023-07-22

## 2023-07-17 RX ORDER — ACETAMINOPHEN 500 MG
975 TABLET ORAL ONCE
Refills: 0 | Status: COMPLETED | OUTPATIENT
Start: 2023-07-17 | End: 2023-07-17

## 2023-07-17 RX ORDER — HYDROMORPHONE HYDROCHLORIDE 2 MG/ML
30 INJECTION INTRAMUSCULAR; INTRAVENOUS; SUBCUTANEOUS
Refills: 0 | Status: DISCONTINUED | OUTPATIENT
Start: 2023-07-17 | End: 2023-07-18

## 2023-07-17 RX ORDER — NALOXONE HYDROCHLORIDE 4 MG/.1ML
0.1 SPRAY NASAL
Refills: 0 | Status: DISCONTINUED | OUTPATIENT
Start: 2023-07-17 | End: 2023-07-22

## 2023-07-17 RX ORDER — HEPARIN SODIUM 5000 [USP'U]/ML
5000 INJECTION INTRAVENOUS; SUBCUTANEOUS EVERY 8 HOURS
Refills: 0 | Status: DISCONTINUED | OUTPATIENT
Start: 2023-07-17 | End: 2023-07-22

## 2023-07-17 RX ORDER — ACETAMINOPHEN 500 MG
1000 TABLET ORAL ONCE
Refills: 0 | Status: COMPLETED | OUTPATIENT
Start: 2023-07-17 | End: 2023-07-17

## 2023-07-17 RX ORDER — HEPARIN SODIUM 5000 [USP'U]/ML
5000 INJECTION INTRAVENOUS; SUBCUTANEOUS ONCE
Refills: 0 | Status: COMPLETED | OUTPATIENT
Start: 2023-07-17 | End: 2023-07-17

## 2023-07-17 RX ORDER — SODIUM CHLORIDE 9 MG/ML
4 INJECTION INTRAMUSCULAR; INTRAVENOUS; SUBCUTANEOUS EVERY 12 HOURS
Refills: 0 | Status: DISCONTINUED | OUTPATIENT
Start: 2023-07-17 | End: 2023-07-22

## 2023-07-17 RX ORDER — ALBUTEROL 90 UG/1
1 AEROSOL, METERED ORAL EVERY 6 HOURS
Refills: 0 | Status: DISCONTINUED | OUTPATIENT
Start: 2023-07-17 | End: 2023-07-22

## 2023-07-17 RX ORDER — SENNA PLUS 8.6 MG/1
2 TABLET ORAL AT BEDTIME
Refills: 0 | Status: DISCONTINUED | OUTPATIENT
Start: 2023-07-17 | End: 2023-07-22

## 2023-07-17 RX ORDER — POLYETHYLENE GLYCOL 3350 17 G/17G
17 POWDER, FOR SOLUTION ORAL DAILY
Refills: 0 | Status: DISCONTINUED | OUTPATIENT
Start: 2023-07-17 | End: 2023-07-22

## 2023-07-17 RX ADMIN — Medication 400 MILLIGRAM(S): at 16:00

## 2023-07-17 RX ADMIN — SODIUM CHLORIDE 30 MILLILITER(S): 9 INJECTION, SOLUTION INTRAVENOUS at 12:05

## 2023-07-17 RX ADMIN — Medication 975 MILLIGRAM(S): at 07:05

## 2023-07-17 RX ADMIN — Medication 1000 MILLIGRAM(S): at 23:30

## 2023-07-17 RX ADMIN — Medication 1000 MILLIGRAM(S): at 16:30

## 2023-07-17 RX ADMIN — SENNA PLUS 2 TABLET(S): 8.6 TABLET ORAL at 21:30

## 2023-07-17 RX ADMIN — ALBUTEROL 1 PUFF(S): 90 AEROSOL, METERED ORAL at 21:33

## 2023-07-17 RX ADMIN — HYDROMORPHONE HYDROCHLORIDE 30 MILLILITER(S): 2 INJECTION INTRAMUSCULAR; INTRAVENOUS; SUBCUTANEOUS at 12:06

## 2023-07-17 RX ADMIN — HEPARIN SODIUM 5000 UNIT(S): 5000 INJECTION INTRAVENOUS; SUBCUTANEOUS at 21:32

## 2023-07-17 RX ADMIN — HEPARIN SODIUM 5000 UNIT(S): 5000 INJECTION INTRAVENOUS; SUBCUTANEOUS at 14:44

## 2023-07-17 RX ADMIN — Medication 400 MILLIGRAM(S): at 23:10

## 2023-07-17 RX ADMIN — SODIUM CHLORIDE 4 MILLILITER(S): 9 INJECTION INTRAMUSCULAR; INTRAVENOUS; SUBCUTANEOUS at 21:32

## 2023-07-17 RX ADMIN — HEPARIN SODIUM 5000 UNIT(S): 5000 INJECTION INTRAVENOUS; SUBCUTANEOUS at 07:05

## 2023-07-17 RX ADMIN — HYDROMORPHONE HYDROCHLORIDE 30 MILLILITER(S): 2 INJECTION INTRAMUSCULAR; INTRAVENOUS; SUBCUTANEOUS at 19:31

## 2023-07-17 NOTE — DISCHARGE NOTE PROVIDER - NSDCFUADDAPPT_GEN_ALL_CORE_FT
Please follow up with Dr. Mario in 10-14 days, call for an appointment [phone].    Please follow up with your PCP in 2-3 weeks. Call for an appointment.

## 2023-07-17 NOTE — BRIEF OPERATIVE NOTE - COMMENTS
I, Robinson Amin PA-C provided direct first assist support to the surgeon during this surgical procedure. My involvement included positioning, prepping and draping the patient prior to surgery, robotic port placement, robotic docking, robotic instrument insertion and removal, ensuring clear visibility and exposure for the surgeon by using instruments such as retractors, suction and sponges, stapling tissues and vessels, retrieving specimens from the operative field, closing surgical incisions, undocking the robot and dressing wounds.  As well as other tasks as directed by the surgeon.

## 2023-07-17 NOTE — PATIENT PROFILE ADULT - FALL HARM RISK - RISK INTERVENTIONS
[facility]    Outpatient Physical Therapy Discharge Note            Patient: Liliana Poole    : 1969    Beginning/End Dates of Reporting Period: 22 to 22    Referring Provider: Madiha Diaz MD    Therapy Diagnosis: vestibular migraine     Client Self Report: 2 migraines since here, one was overnight. Didn't try the abortive migraines meds because it wasn't the reason why she was going to trial it. A bit of nasal congestion for last few days. No vertigo attacks. Ex are going well. Balance is about the same. Is now 6 mos post Sargent palsy, feel like that is getting better. Has ENT appt that she might not keep. Per neuro, could restart noratryptllyine if needed for HA.    Objective Measurements:  Objective Measure: sx scale  Details: 1/10 something unsettled     Goals:  Goal Identifier 1 DHI   Goal Description Pt will improve her score on the DHI (Dizziness Handicap Inventory) to 25% or less indicating a clinically meaningful improvement in self-perception of dizziness over time.   Target Date 23   Date Met      Progress (detail required for progress note): Did not reassess at last visit.     Goal Identifier 2 DVA   Goal Description Pt will improve to reading 20/40 or better on the Dynamic Visual Acuity test at a speed of 2 Hz in order to note improved gaze stability with driving and shopping   Target Date 23   Date Met      Progress (detail required for progress note): Pt able to read at 20/30 at 2 Hz so goal is met.     Plan: Discharge from therapy.    Reason for Discharge: goals met or sufficient for home, pt feels she has some tools to work on things at home and feels ready to be discharged    Equipment Issued: none    Discharge Plan: Patient to continue home program.   Assistance OOB with selected safe patient handling equipment/Assistance with ambulation/Communicate Fall Risk and Risk Factors to all staff, patient, and family/Monitor gait and stability/Reinforce activity limits and safety measures with patient and family/Sit up slowly, dangle for a short time, stand at bedside before walking/Use of alarms - bed, chair and/or voice tab/Visual Cue: Yellow wristband/Bed in lowest position, wheels locked, appropriate side rails in place/Call bell, personal items and telephone in reach/Instruct patient to call for assistance before getting out of bed or chair/Non-slip footwear when patient is out of bed/[place] to call system/Physically safe environment - no spills, clutter or unnecessary equipment/Purposeful Proactive Rounding/Room/bathroom lighting operational, light cord in reach

## 2023-07-17 NOTE — PATIENT PROFILE ADULT - LEGAL HELP
Daily Note     Today's date: 2022  Patient name: Stephen Mcghee  : 1960  MRN: [id number]  Referring provider: Carissa Nguyen,*  Dx:   Encounter Diagnosis     ICD-10-CM    1  Status post right knee replacement  Z96 651    2  Primary osteoarthritis of right knee  M17 11    3  Chronic pain of right knee  M25 561     G89 29                   Subjective: Pt reports less difficulty sitting but still cannot actively lift her leg  Objective: See treatment diary below  PROM: 4-91 deg    Assessment: Pt demonstrated improved PROM, poor quad activation  Plan: Cont  POC  Precautions:  PMHx: R hip OA, anxiety, WBAT  Dx: R TKA 22    Manuals          R knee PROM  10 min 10 min 10 min                                                Neuro Re-Ed                                                                                                        Ther Ex             Bike: rocking  nv S=9  5 min S=9  5 min         Heel slides 5"x10 5"x5 5"x10 5"x20         Quad sets 5"x5 5"x5 5"x15 5"x20         Supine GA stretch 10"x3 10"x2 10"x3 10"x3         SAQ 3"/  1x10 AAROM  1x5 AAROM  2x10          LAQ    AAROM  2x10 AAROM  3x10        STS   Foam  1x5 nv Foam  1x10                     Ther Activity                                       Gait Training             Gait training on floor  FWW  1x200 ft FWW  1x400 ft FWW  1x150 ft  SPC  1x250 ft           Step-ups/  downs  FWW  4"/  1x5 FWW  4"/  1x10 Rail  4"/  1x10         Modalities no

## 2023-07-17 NOTE — BRIEF OPERATIVE NOTE - NSICDXBRIEFPROCEDURE_GEN_ALL_CORE_FT
PROCEDURES:  Lobectomy, lung, robot-assisted, using da Renay Si 17-Jul-2023 11:51:21 FB, Robot-assisted RVATS RUL Lobectomy, MLND Rigoberto Chatman

## 2023-07-17 NOTE — DISCHARGE NOTE PROVIDER - NSDCCPTREATMENT_GEN_ALL_CORE_FT
PRINCIPAL PROCEDURE  Procedure: Lobectomy, lung, robot-assisted, using da Renay Si  Findings and Treatment: FB, Robot-assisted RVATS RUL Lobectomy, MLND

## 2023-07-17 NOTE — PATIENT PROFILE ADULT - FUNCTIONAL ASSESSMENT - DAILY ACTIVITY 1.
Group Therapy Documentation    PATIENT'S NAME: Jenae Callaway  MRN:   [id number]  :   2003  ACCT. NUMBER: [id number]  DATE OF SERVICE: 3/04/22  START TIME:  9:33 AM  END TIME: 10:38 AM  FACILITATOR(S): Kriss Ramos MSW  TOPIC: Child/Adol Group Therapy  Number of patients attending the group:  4  Group Length:  1 Hours    Summary of Group / Topics Discussed:    Emotion Regulation:  PLEASE  Group Therapy/Process Group:  Verbal Group      Group Attendance:  Attended group session    Patient's response to the group topic/interactions:  discussed personal experience with topic    Patient appeared to be Actively participating.       Client specific details:  Lena reported that her depression is at a 4, anxiety is a 7, anger 2, SIB 0 SI 2 (Able to keep self safe), Sirena 6, feeling indifferent Grateful for everyone here, goal is to get time out of the house, either with friends or family.   Lena stated that she woke up late today, she didn't sleep well, she ended up sleeping on top of her bed, she was just going to lay down for a bit, and ended up falling asleep til 3 am, and then woke up and fell asleep again and woke up again at 545 am.  She was tired, from soccer and just laid down to relax and fell asleep.  She did car pool with 4 friends and they went to [facility] for [employer].  She wants to spend time with friends during the weekend.  .         4 = No assist / stand by assistance

## 2023-07-17 NOTE — PATIENT PROFILE ADULT - FALL HARM RISK - UNIVERSAL INTERVENTIONS
Bed in lowest position, wheels locked, appropriate side rails in place/Call bell, personal items and telephone in reach/Instruct patient to call for assistance before getting out of bed or chair/Non-slip footwear when patient is out of bed/[place] to call system/Physically safe environment - no spills, clutter or unnecessary equipment/Purposeful Proactive Rounding/Room/bathroom lighting operational, light cord in reach

## 2023-07-17 NOTE — DISCHARGE NOTE PROVIDER - CARE PROVIDER_API CALL
Matt Mario Moreno  Thoracic Surgery  [id number] [place] [id number]  Phone: [phone]  Fax: [phone]  Follow Up Time:

## 2023-07-17 NOTE — DISCHARGE NOTE PROVIDER - NSDCMRMEDTOKEN_GEN_ALL_CORE_FT
Chantix 1 mg oral tablet: 1 orally once a day   Chantix 1 mg oral tablet: 1 orally once a day  polyethylene glycol 3350 oral powder for reconstitution: 17 gram(s) orally once a day  senna leaf extract oral tablet: 2 tab(s) orally once a day (at bedtime)   acetaminophen 325 mg oral tablet: 2 tab(s) orally every 6 hours As needed Mild Pain (1 - 3)  Chantix 1 mg oral tablet: 1 orally once a day  Dilaudid 2 mg oral tablet: 1 tab(s) orally every 4 hours Can take 0.5 to 1 pill for moderate to severe pain MDD: 6  PA/Lat Chest Xray: PA/Lat Chest Xray  Dx: s/p Robotic RVATS, RULobectomy, MLND  ICD10: R91.8  Please fax results to Dr. Mario [phone]  polyethylene glycol 335 oral powder for reconstitution: 17 gram(s) orally once a day  senna leaf extract oral tablet: 2 tab(s) orally once a day (at bedtime)

## 2023-07-17 NOTE — DISCHARGE NOTE PROVIDER - HOSPITAL COURSE
77 yo w/ history of former smoker, polycythemia, cholecystectomy, pulmonary nodule. On 7/17/23 s/p FB, Robot-assisted RVATS RUL Lobectomy, MLND. Chest tube was removed and post xray reviewed. Patient is stable for discharge with out-patient follow up. 77 yo w/ history of former smoker, polycythemia, cholecystectomy, pulmonary nodule. On 7/17/23 s/p FB, Robot-assisted RVATS RUL Lobectomy, MLND. Patient's post-op course was c/b an air leak and failed water seal trial twice. After passing water seal trial on POD5, her chest tube was clamped. Once clamp trial was passed and f/u CXR was stable. Her chest tube was removed and post xray reviewed. At this point, she was deemed stable for discharge with out-patient follow up instructions per Dr. Mario.

## 2023-07-17 NOTE — DISCHARGE NOTE PROVIDER - NSDCFUADDINST_GEN_ALL_CORE_FT
Please continue to walk daily, increasing as tolerated, practice deep breathing and coughing. You will have one blue stitch left in place which will be removed by Dr. Mario at your follow up appointment. Please shower daily, allow water and soap to run over your incisions site, do not scrub. Pat to dry and leave open to air. Please monitor your surgical sites for increased swelling, pain, redness, discharge or warmth from the area. If these symptoms or new SOB, chest pain, fevers or chills occur, please call the office, [phone].

## 2023-07-18 LAB
ANION GAP SERPL CALC-SCNC: 12 MMOL/L — SIGNIFICANT CHANGE UP (ref 7–14)
BASOPHILS # BLD AUTO: 0.02 K/UL — SIGNIFICANT CHANGE UP (ref 0–0.2)
BASOPHILS NFR BLD AUTO: 0.2 % — SIGNIFICANT CHANGE UP (ref 0–2)
BUN SERPL-MCNC: 16 MG/DL — SIGNIFICANT CHANGE UP (ref 7–23)
CALCIUM SERPL-MCNC: 9.3 MG/DL — SIGNIFICANT CHANGE UP (ref 8.4–10.5)
CHLORIDE SERPL-SCNC: 96 MMOL/L — LOW (ref 98–107)
CO2 SERPL-SCNC: 24 MMOL/L — SIGNIFICANT CHANGE UP (ref 22–31)
CREAT SERPL-MCNC: 0.55 MG/DL — SIGNIFICANT CHANGE UP (ref 0.5–1.3)
EGFR: 95 ML/MIN/1.73M2 — SIGNIFICANT CHANGE UP
EOSINOPHIL # BLD AUTO: 0.01 K/UL — SIGNIFICANT CHANGE UP (ref 0–0.5)
EOSINOPHIL NFR BLD AUTO: 0.1 % — SIGNIFICANT CHANGE UP (ref 0–6)
GLUCOSE SERPL-MCNC: 147 MG/DL — HIGH (ref 70–99)
HCT VFR BLD CALC: 47.6 % — HIGH (ref 34.5–45)
HGB BLD-MCNC: 15.8 G/DL — HIGH (ref 11.5–15.5)
IANC: 11.69 K/UL — HIGH (ref 1.8–7.4)
IMM GRANULOCYTES NFR BLD AUTO: 0.3 % — SIGNIFICANT CHANGE UP (ref 0–0.9)
LYMPHOCYTES # BLD AUTO: 0.69 K/UL — LOW (ref 1–3.3)
LYMPHOCYTES # BLD AUTO: 5.2 % — LOW (ref 13–44)
MAGNESIUM SERPL-MCNC: 2 MG/DL — SIGNIFICANT CHANGE UP (ref 1.6–2.6)
MCHC RBC-ENTMCNC: 31 PG — SIGNIFICANT CHANGE UP (ref 27–34)
MCHC RBC-ENTMCNC: 33.2 GM/DL — SIGNIFICANT CHANGE UP (ref 32–36)
MCV RBC AUTO: 93.3 FL — SIGNIFICANT CHANGE UP (ref 80–100)
MONOCYTES # BLD AUTO: 0.8 K/UL — SIGNIFICANT CHANGE UP (ref 0–0.9)
MONOCYTES NFR BLD AUTO: 6 % — SIGNIFICANT CHANGE UP (ref 2–14)
NEUTROPHILS # BLD AUTO: 11.69 K/UL — HIGH (ref 1.8–7.4)
NEUTROPHILS NFR BLD AUTO: 88.2 % — HIGH (ref 43–77)
NRBC # BLD: 0 /100 WBCS — SIGNIFICANT CHANGE UP (ref 0–0)
NRBC # FLD: 0 K/UL — SIGNIFICANT CHANGE UP (ref 0–0)
PHOSPHATE SERPL-MCNC: 3.7 MG/DL — SIGNIFICANT CHANGE UP (ref 2.5–4.5)
PLATELET # BLD AUTO: 224 K/UL — SIGNIFICANT CHANGE UP (ref 150–400)
POTASSIUM SERPL-MCNC: 5.1 MMOL/L — SIGNIFICANT CHANGE UP (ref 3.5–5.3)
POTASSIUM SERPL-SCNC: 5.1 MMOL/L — SIGNIFICANT CHANGE UP (ref 3.5–5.3)
RBC # BLD: 5.1 M/UL — SIGNIFICANT CHANGE UP (ref 3.8–5.2)
RBC # FLD: 11.9 % — SIGNIFICANT CHANGE UP (ref 10.3–14.5)
SODIUM SERPL-SCNC: 132 MMOL/L — LOW (ref 135–145)
WBC # BLD: 13.25 K/UL — HIGH (ref 3.8–10.5)
WBC # FLD AUTO: 13.25 K/UL — HIGH (ref 3.8–10.5)

## 2023-07-18 PROCEDURE — 71045 X-RAY EXAM CHEST 1 VIEW: CPT | Mod: 26,76

## 2023-07-18 PROCEDURE — 99233 SBSQ HOSP IP/OBS HIGH 50: CPT

## 2023-07-18 RX ORDER — ALBUMIN HUMAN 25 %
250 VIAL (ML) INTRAVENOUS ONCE
Refills: 0 | Status: COMPLETED | OUTPATIENT
Start: 2023-07-18 | End: 2023-07-18

## 2023-07-18 RX ORDER — ACETAMINOPHEN 500 MG
650 TABLET ORAL EVERY 6 HOURS
Refills: 0 | Status: DISCONTINUED | OUTPATIENT
Start: 2023-07-18 | End: 2023-07-19

## 2023-07-18 RX ORDER — SODIUM CHLORIDE 9 MG/ML
250 INJECTION, SOLUTION INTRAVENOUS ONCE
Refills: 0 | Status: COMPLETED | OUTPATIENT
Start: 2023-07-18 | End: 2023-07-18

## 2023-07-18 RX ORDER — HYDROMORPHONE HYDROCHLORIDE 2 MG/ML
2 INJECTION INTRAMUSCULAR; INTRAVENOUS; SUBCUTANEOUS
Refills: 0 | Status: DISCONTINUED | OUTPATIENT
Start: 2023-07-18 | End: 2023-07-22

## 2023-07-18 RX ORDER — FAMOTIDINE 10 MG/ML
20 INJECTION INTRAVENOUS
Refills: 0 | Status: DISCONTINUED | OUTPATIENT
Start: 2023-07-18 | End: 2023-07-22

## 2023-07-18 RX ORDER — LIDOCAINE 4 G/100G
1 CREAM TOPICAL EVERY 24 HOURS
Refills: 0 | Status: COMPLETED | OUTPATIENT
Start: 2023-07-18 | End: 2023-07-22

## 2023-07-18 RX ADMIN — Medication 650 MILLIGRAM(S): at 23:31

## 2023-07-18 RX ADMIN — SODIUM CHLORIDE 4 MILLILITER(S): 9 INJECTION INTRAMUSCULAR; INTRAVENOUS; SUBCUTANEOUS at 10:54

## 2023-07-18 RX ADMIN — HYDROMORPHONE HYDROCHLORIDE 2 MILLIGRAM(S): 2 INJECTION INTRAMUSCULAR; INTRAVENOUS; SUBCUTANEOUS at 18:15

## 2023-07-18 RX ADMIN — HEPARIN SODIUM 5000 UNIT(S): 5000 INJECTION INTRAVENOUS; SUBCUTANEOUS at 05:56

## 2023-07-18 RX ADMIN — ALBUTEROL 1 PUFF(S): 90 AEROSOL, METERED ORAL at 10:54

## 2023-07-18 RX ADMIN — HYDROMORPHONE HYDROCHLORIDE 2 MILLIGRAM(S): 2 INJECTION INTRAMUSCULAR; INTRAVENOUS; SUBCUTANEOUS at 15:26

## 2023-07-18 RX ADMIN — SODIUM CHLORIDE 4 MILLILITER(S): 9 INJECTION INTRAMUSCULAR; INTRAVENOUS; SUBCUTANEOUS at 22:15

## 2023-07-18 RX ADMIN — SODIUM CHLORIDE 250 MILLILITER(S): 9 INJECTION, SOLUTION INTRAVENOUS at 01:14

## 2023-07-18 RX ADMIN — Medication 650 MILLIGRAM(S): at 18:02

## 2023-07-18 RX ADMIN — HYDROMORPHONE HYDROCHLORIDE 2 MILLIGRAM(S): 2 INJECTION INTRAMUSCULAR; INTRAVENOUS; SUBCUTANEOUS at 12:30

## 2023-07-18 RX ADMIN — Medication 125 MILLILITER(S): at 14:51

## 2023-07-18 RX ADMIN — HEPARIN SODIUM 5000 UNIT(S): 5000 INJECTION INTRAVENOUS; SUBCUTANEOUS at 22:33

## 2023-07-18 RX ADMIN — Medication 1 MILLIGRAM(S): at 18:04

## 2023-07-18 RX ADMIN — Medication 650 MILLIGRAM(S): at 12:30

## 2023-07-18 RX ADMIN — FAMOTIDINE 20 MILLIGRAM(S): 10 INJECTION INTRAVENOUS at 18:02

## 2023-07-18 RX ADMIN — HYDROMORPHONE HYDROCHLORIDE 2 MILLIGRAM(S): 2 INJECTION INTRAMUSCULAR; INTRAVENOUS; SUBCUTANEOUS at 15:11

## 2023-07-18 RX ADMIN — ALBUTEROL 1 PUFF(S): 90 AEROSOL, METERED ORAL at 16:55

## 2023-07-18 RX ADMIN — Medication 650 MILLIGRAM(S): at 11:57

## 2023-07-18 RX ADMIN — LIDOCAINE 1 PATCH: 4 CREAM TOPICAL at 18:02

## 2023-07-18 RX ADMIN — LIDOCAINE 1 PATCH: 4 CREAM TOPICAL at 19:27

## 2023-07-18 RX ADMIN — Medication 650 MILLIGRAM(S): at 23:01

## 2023-07-18 RX ADMIN — Medication 650 MILLIGRAM(S): at 18:45

## 2023-07-18 RX ADMIN — HEPARIN SODIUM 5000 UNIT(S): 5000 INJECTION INTRAVENOUS; SUBCUTANEOUS at 13:28

## 2023-07-18 RX ADMIN — HYDROMORPHONE HYDROCHLORIDE 2 MILLIGRAM(S): 2 INJECTION INTRAMUSCULAR; INTRAVENOUS; SUBCUTANEOUS at 18:45

## 2023-07-18 RX ADMIN — HYDROMORPHONE HYDROCHLORIDE 2 MILLIGRAM(S): 2 INJECTION INTRAMUSCULAR; INTRAVENOUS; SUBCUTANEOUS at 11:56

## 2023-07-18 RX ADMIN — SENNA PLUS 2 TABLET(S): 8.6 TABLET ORAL at 22:32

## 2023-07-18 NOTE — PHYSICAL THERAPY INITIAL EVALUATION ADULT - PERTINENT HX OF CURRENT PROBLEM, REHAB EVAL
Pt is a 78 yr old female scheduled for Right Video Assisted Thoracoscopic Surgery Robotic Assisted Right Upper Lobectomy with Dr Mario tentatively 7/17/23 - pt hx polycythemia , smoking - CT scan noted pulmonary nodule - pt denies cough or SOB

## 2023-07-18 NOTE — PHYSICAL THERAPY INITIAL EVALUATION ADULT - ADDITIONAL COMMENTS
Pt reports she lives in a home 3 steps to enter with her , no falls, fully independent with all activities, does not use DME.

## 2023-07-18 NOTE — PHYSICAL THERAPY INITIAL EVALUATION ADULT - SITTING BALANCE: DYNAMIC
· First diagnosed in December 2021 and underwent IR procedure   · Slightly increased in size  · Received 4 unit PRBC  · Trend H/H and transfuse as needed  · If patient continus to bleed may require repeat IR intervention  · Surgery consulted and signed off - no intervention at this time  · Consider re-engaging surgery regarding increased abdominal pressure which may be contributing to ventilator needs   However this is likely multifactorial in setting of anasarca 2/2 volume overload in addition to abdominal wall hematoma and ileus good balance

## 2023-07-19 LAB
ANION GAP SERPL CALC-SCNC: 6 MMOL/L — LOW (ref 7–14)
BUN SERPL-MCNC: 16 MG/DL — SIGNIFICANT CHANGE UP (ref 7–23)
CALCIUM SERPL-MCNC: 9.3 MG/DL — SIGNIFICANT CHANGE UP (ref 8.4–10.5)
CHLORIDE SERPL-SCNC: 95 MMOL/L — LOW (ref 98–107)
CO2 SERPL-SCNC: 26 MMOL/L — SIGNIFICANT CHANGE UP (ref 22–31)
CREAT SERPL-MCNC: 0.58 MG/DL — SIGNIFICANT CHANGE UP (ref 0.5–1.3)
EGFR: 94 ML/MIN/1.73M2 — SIGNIFICANT CHANGE UP
GLUCOSE SERPL-MCNC: 113 MG/DL — HIGH (ref 70–99)
HCT VFR BLD CALC: 47.6 % — HIGH (ref 34.5–45)
HGB BLD-MCNC: 15.4 G/DL — SIGNIFICANT CHANGE UP (ref 11.5–15.5)
MAGNESIUM SERPL-MCNC: 1.9 MG/DL — SIGNIFICANT CHANGE UP (ref 1.6–2.6)
MCHC RBC-ENTMCNC: 31.2 PG — SIGNIFICANT CHANGE UP (ref 27–34)
MCHC RBC-ENTMCNC: 32.4 GM/DL — SIGNIFICANT CHANGE UP (ref 32–36)
MCV RBC AUTO: 96.6 FL — SIGNIFICANT CHANGE UP (ref 80–100)
NRBC # BLD: 0 /100 WBCS — SIGNIFICANT CHANGE UP (ref 0–0)
NRBC # FLD: 0 K/UL — SIGNIFICANT CHANGE UP (ref 0–0)
PHOSPHATE SERPL-MCNC: 2.6 MG/DL — SIGNIFICANT CHANGE UP (ref 2.5–4.5)
PLATELET # BLD AUTO: 204 K/UL — SIGNIFICANT CHANGE UP (ref 150–400)
POTASSIUM SERPL-MCNC: 4.5 MMOL/L — SIGNIFICANT CHANGE UP (ref 3.5–5.3)
POTASSIUM SERPL-SCNC: 4.5 MMOL/L — SIGNIFICANT CHANGE UP (ref 3.5–5.3)
RBC # BLD: 4.93 M/UL — SIGNIFICANT CHANGE UP (ref 3.8–5.2)
RBC # FLD: 12.2 % — SIGNIFICANT CHANGE UP (ref 10.3–14.5)
SODIUM SERPL-SCNC: 127 MMOL/L — LOW (ref 135–145)
WBC # BLD: 10.15 K/UL — SIGNIFICANT CHANGE UP (ref 3.8–10.5)
WBC # FLD AUTO: 10.15 K/UL — SIGNIFICANT CHANGE UP (ref 3.8–10.5)

## 2023-07-19 PROCEDURE — 71045 X-RAY EXAM CHEST 1 VIEW: CPT | Mod: 26

## 2023-07-19 RX ORDER — KETOROLAC TROMETHAMINE 30 MG/ML
15 SYRINGE (ML) INJECTION EVERY 6 HOURS
Refills: 0 | Status: DISCONTINUED | OUTPATIENT
Start: 2023-07-19 | End: 2023-07-21

## 2023-07-19 RX ORDER — ACETAMINOPHEN 500 MG
1000 TABLET ORAL ONCE
Refills: 0 | Status: COMPLETED | OUTPATIENT
Start: 2023-07-19 | End: 2023-07-19

## 2023-07-19 RX ADMIN — POLYETHYLENE GLYCOL 3350 17 GRAM(S): 17 POWDER, FOR SOLUTION ORAL at 11:53

## 2023-07-19 RX ADMIN — Medication 15 MILLIGRAM(S): at 23:04

## 2023-07-19 RX ADMIN — SENNA PLUS 2 TABLET(S): 8.6 TABLET ORAL at 22:59

## 2023-07-19 RX ADMIN — Medication 1000 MILLIGRAM(S): at 18:04

## 2023-07-19 RX ADMIN — FAMOTIDINE 20 MILLIGRAM(S): 10 INJECTION INTRAVENOUS at 17:35

## 2023-07-19 RX ADMIN — ALBUTEROL 1 PUFF(S): 90 AEROSOL, METERED ORAL at 13:31

## 2023-07-19 RX ADMIN — HYDROMORPHONE HYDROCHLORIDE 2 MILLIGRAM(S): 2 INJECTION INTRAMUSCULAR; INTRAVENOUS; SUBCUTANEOUS at 04:00

## 2023-07-19 RX ADMIN — FAMOTIDINE 20 MILLIGRAM(S): 10 INJECTION INTRAVENOUS at 05:24

## 2023-07-19 RX ADMIN — LIDOCAINE 1 PATCH: 4 CREAM TOPICAL at 20:13

## 2023-07-19 RX ADMIN — Medication 400 MILLIGRAM(S): at 17:34

## 2023-07-19 RX ADMIN — Medication 1 MILLIGRAM(S): at 20:13

## 2023-07-19 RX ADMIN — Medication 650 MILLIGRAM(S): at 05:54

## 2023-07-19 RX ADMIN — Medication 15 MILLIGRAM(S): at 10:02

## 2023-07-19 RX ADMIN — Medication 15 MILLIGRAM(S): at 10:32

## 2023-07-19 RX ADMIN — Medication 400 MILLIGRAM(S): at 11:54

## 2023-07-19 RX ADMIN — HEPARIN SODIUM 5000 UNIT(S): 5000 INJECTION INTRAVENOUS; SUBCUTANEOUS at 13:30

## 2023-07-19 RX ADMIN — Medication 1 MILLIGRAM(S): at 08:40

## 2023-07-19 RX ADMIN — Medication 15 MILLIGRAM(S): at 18:04

## 2023-07-19 RX ADMIN — LIDOCAINE 1 PATCH: 4 CREAM TOPICAL at 06:02

## 2023-07-19 RX ADMIN — HEPARIN SODIUM 5000 UNIT(S): 5000 INJECTION INTRAVENOUS; SUBCUTANEOUS at 05:25

## 2023-07-19 RX ADMIN — SODIUM CHLORIDE 4 MILLILITER(S): 9 INJECTION INTRAMUSCULAR; INTRAVENOUS; SUBCUTANEOUS at 21:51

## 2023-07-19 RX ADMIN — Medication 15 MILLIGRAM(S): at 23:20

## 2023-07-19 RX ADMIN — Medication 1000 MILLIGRAM(S): at 12:24

## 2023-07-19 RX ADMIN — HEPARIN SODIUM 5000 UNIT(S): 5000 INJECTION INTRAVENOUS; SUBCUTANEOUS at 22:59

## 2023-07-19 RX ADMIN — Medication 15 MILLIGRAM(S): at 17:34

## 2023-07-19 RX ADMIN — LIDOCAINE 1 PATCH: 4 CREAM TOPICAL at 17:31

## 2023-07-19 RX ADMIN — Medication 650 MILLIGRAM(S): at 05:24

## 2023-07-19 RX ADMIN — HYDROMORPHONE HYDROCHLORIDE 2 MILLIGRAM(S): 2 INJECTION INTRAMUSCULAR; INTRAVENOUS; SUBCUTANEOUS at 04:30

## 2023-07-20 LAB
ANION GAP SERPL CALC-SCNC: 11 MMOL/L — SIGNIFICANT CHANGE UP (ref 7–14)
BUN SERPL-MCNC: 18 MG/DL — SIGNIFICANT CHANGE UP (ref 7–23)
CALCIUM SERPL-MCNC: 9.4 MG/DL — SIGNIFICANT CHANGE UP (ref 8.4–10.5)
CHLORIDE SERPL-SCNC: 95 MMOL/L — LOW (ref 98–107)
CO2 SERPL-SCNC: 24 MMOL/L — SIGNIFICANT CHANGE UP (ref 22–31)
CREAT SERPL-MCNC: 0.56 MG/DL — SIGNIFICANT CHANGE UP (ref 0.5–1.3)
EGFR: 95 ML/MIN/1.73M2 — SIGNIFICANT CHANGE UP
GLUCOSE SERPL-MCNC: 107 MG/DL — HIGH (ref 70–99)
HCT VFR BLD CALC: 44.1 % — SIGNIFICANT CHANGE UP (ref 34.5–45)
HGB BLD-MCNC: 14.3 G/DL — SIGNIFICANT CHANGE UP (ref 11.5–15.5)
MAGNESIUM SERPL-MCNC: 2 MG/DL — SIGNIFICANT CHANGE UP (ref 1.6–2.6)
MCHC RBC-ENTMCNC: 31.1 PG — SIGNIFICANT CHANGE UP (ref 27–34)
MCHC RBC-ENTMCNC: 32.4 GM/DL — SIGNIFICANT CHANGE UP (ref 32–36)
MCV RBC AUTO: 95.9 FL — SIGNIFICANT CHANGE UP (ref 80–100)
NRBC # BLD: 0 /100 WBCS — SIGNIFICANT CHANGE UP (ref 0–0)
NRBC # FLD: 0 K/UL — SIGNIFICANT CHANGE UP (ref 0–0)
PHOSPHATE SERPL-MCNC: 3.2 MG/DL — SIGNIFICANT CHANGE UP (ref 2.5–4.5)
PLATELET # BLD AUTO: 226 K/UL — SIGNIFICANT CHANGE UP (ref 150–400)
POTASSIUM SERPL-MCNC: 4.5 MMOL/L — SIGNIFICANT CHANGE UP (ref 3.5–5.3)
POTASSIUM SERPL-SCNC: 4.5 MMOL/L — SIGNIFICANT CHANGE UP (ref 3.5–5.3)
RBC # BLD: 4.6 M/UL — SIGNIFICANT CHANGE UP (ref 3.8–5.2)
RBC # FLD: 12.1 % — SIGNIFICANT CHANGE UP (ref 10.3–14.5)
SODIUM SERPL-SCNC: 130 MMOL/L — LOW (ref 135–145)
WBC # BLD: 8.95 K/UL — SIGNIFICANT CHANGE UP (ref 3.8–10.5)
WBC # FLD AUTO: 8.95 K/UL — SIGNIFICANT CHANGE UP (ref 3.8–10.5)

## 2023-07-20 PROCEDURE — 71045 X-RAY EXAM CHEST 1 VIEW: CPT | Mod: 26

## 2023-07-20 RX ORDER — ACETAMINOPHEN 500 MG
1000 TABLET ORAL ONCE
Refills: 0 | Status: COMPLETED | OUTPATIENT
Start: 2023-07-20 | End: 2023-07-20

## 2023-07-20 RX ADMIN — Medication 15 MILLIGRAM(S): at 05:24

## 2023-07-20 RX ADMIN — HEPARIN SODIUM 5000 UNIT(S): 5000 INJECTION INTRAVENOUS; SUBCUTANEOUS at 15:01

## 2023-07-20 RX ADMIN — Medication 15 MILLIGRAM(S): at 11:28

## 2023-07-20 RX ADMIN — SODIUM CHLORIDE 4 MILLILITER(S): 9 INJECTION INTRAMUSCULAR; INTRAVENOUS; SUBCUTANEOUS at 20:13

## 2023-07-20 RX ADMIN — Medication 15 MILLIGRAM(S): at 05:40

## 2023-07-20 RX ADMIN — HEPARIN SODIUM 5000 UNIT(S): 5000 INJECTION INTRAVENOUS; SUBCUTANEOUS at 05:23

## 2023-07-20 RX ADMIN — FAMOTIDINE 20 MILLIGRAM(S): 10 INJECTION INTRAVENOUS at 05:23

## 2023-07-20 RX ADMIN — Medication 1 MILLIGRAM(S): at 21:01

## 2023-07-20 RX ADMIN — HYDROMORPHONE HYDROCHLORIDE 2 MILLIGRAM(S): 2 INJECTION INTRAMUSCULAR; INTRAVENOUS; SUBCUTANEOUS at 20:42

## 2023-07-20 RX ADMIN — Medication 1 MILLIGRAM(S): at 09:31

## 2023-07-20 RX ADMIN — ALBUTEROL 1 PUFF(S): 90 AEROSOL, METERED ORAL at 05:57

## 2023-07-20 RX ADMIN — LIDOCAINE 1 PATCH: 4 CREAM TOPICAL at 05:35

## 2023-07-20 RX ADMIN — Medication 15 MILLIGRAM(S): at 18:17

## 2023-07-20 RX ADMIN — FAMOTIDINE 20 MILLIGRAM(S): 10 INJECTION INTRAVENOUS at 18:18

## 2023-07-20 RX ADMIN — Medication 15 MILLIGRAM(S): at 18:48

## 2023-07-20 RX ADMIN — Medication 15 MILLIGRAM(S): at 12:00

## 2023-07-20 RX ADMIN — HYDROMORPHONE HYDROCHLORIDE 2 MILLIGRAM(S): 2 INJECTION INTRAMUSCULAR; INTRAVENOUS; SUBCUTANEOUS at 20:12

## 2023-07-20 RX ADMIN — ALBUTEROL 1 PUFF(S): 90 AEROSOL, METERED ORAL at 18:17

## 2023-07-20 RX ADMIN — LIDOCAINE 1 PATCH: 4 CREAM TOPICAL at 22:00

## 2023-07-20 RX ADMIN — HEPARIN SODIUM 5000 UNIT(S): 5000 INJECTION INTRAVENOUS; SUBCUTANEOUS at 21:04

## 2023-07-20 RX ADMIN — ALBUTEROL 1 PUFF(S): 90 AEROSOL, METERED ORAL at 11:28

## 2023-07-20 RX ADMIN — Medication 1000 MILLIGRAM(S): at 11:37

## 2023-07-20 RX ADMIN — SODIUM CHLORIDE 4 MILLILITER(S): 9 INJECTION INTRAMUSCULAR; INTRAVENOUS; SUBCUTANEOUS at 10:29

## 2023-07-20 RX ADMIN — Medication 400 MILLIGRAM(S): at 11:04

## 2023-07-20 RX ADMIN — POLYETHYLENE GLYCOL 3350 17 GRAM(S): 17 POWDER, FOR SOLUTION ORAL at 11:29

## 2023-07-20 RX ADMIN — LIDOCAINE 1 PATCH: 4 CREAM TOPICAL at 18:17

## 2023-07-21 LAB
ANION GAP SERPL CALC-SCNC: 12 MMOL/L — SIGNIFICANT CHANGE UP (ref 7–14)
BUN SERPL-MCNC: 14 MG/DL — SIGNIFICANT CHANGE UP (ref 7–23)
CALCIUM SERPL-MCNC: 9.4 MG/DL — SIGNIFICANT CHANGE UP (ref 8.4–10.5)
CHLORIDE SERPL-SCNC: 97 MMOL/L — LOW (ref 98–107)
CO2 SERPL-SCNC: 24 MMOL/L — SIGNIFICANT CHANGE UP (ref 22–31)
CREAT SERPL-MCNC: 0.56 MG/DL — SIGNIFICANT CHANGE UP (ref 0.5–1.3)
EGFR: 95 ML/MIN/1.73M2 — SIGNIFICANT CHANGE UP
GLUCOSE SERPL-MCNC: 112 MG/DL — HIGH (ref 70–99)
HCT VFR BLD CALC: 43.7 % — SIGNIFICANT CHANGE UP (ref 34.5–45)
HGB BLD-MCNC: 14.8 G/DL — SIGNIFICANT CHANGE UP (ref 11.5–15.5)
MAGNESIUM SERPL-MCNC: 1.9 MG/DL — SIGNIFICANT CHANGE UP (ref 1.6–2.6)
MCHC RBC-ENTMCNC: 31.6 PG — SIGNIFICANT CHANGE UP (ref 27–34)
MCHC RBC-ENTMCNC: 33.9 GM/DL — SIGNIFICANT CHANGE UP (ref 32–36)
MCV RBC AUTO: 93.2 FL — SIGNIFICANT CHANGE UP (ref 80–100)
NRBC # BLD: 0 /100 WBCS — SIGNIFICANT CHANGE UP (ref 0–0)
NRBC # FLD: 0 K/UL — SIGNIFICANT CHANGE UP (ref 0–0)
PHOSPHATE SERPL-MCNC: 3.8 MG/DL — SIGNIFICANT CHANGE UP (ref 2.5–4.5)
PLATELET # BLD AUTO: 257 K/UL — SIGNIFICANT CHANGE UP (ref 150–400)
POTASSIUM SERPL-MCNC: 4.5 MMOL/L — SIGNIFICANT CHANGE UP (ref 3.5–5.3)
POTASSIUM SERPL-SCNC: 4.5 MMOL/L — SIGNIFICANT CHANGE UP (ref 3.5–5.3)
RBC # BLD: 4.69 M/UL — SIGNIFICANT CHANGE UP (ref 3.8–5.2)
RBC # FLD: 12 % — SIGNIFICANT CHANGE UP (ref 10.3–14.5)
SODIUM SERPL-SCNC: 133 MMOL/L — LOW (ref 135–145)
WBC # BLD: 7.85 K/UL — SIGNIFICANT CHANGE UP (ref 3.8–10.5)
WBC # FLD AUTO: 7.85 K/UL — SIGNIFICANT CHANGE UP (ref 3.8–10.5)

## 2023-07-21 PROCEDURE — 71045 X-RAY EXAM CHEST 1 VIEW: CPT | Mod: 26

## 2023-07-21 PROCEDURE — 71045 X-RAY EXAM CHEST 1 VIEW: CPT | Mod: 26,77

## 2023-07-21 RX ORDER — KETOROLAC TROMETHAMINE 30 MG/ML
15 SYRINGE (ML) INJECTION EVERY 6 HOURS
Refills: 0 | Status: DISCONTINUED | OUTPATIENT
Start: 2023-07-21 | End: 2023-07-22

## 2023-07-21 RX ORDER — ACETAMINOPHEN 500 MG
650 TABLET ORAL ONCE
Refills: 0 | Status: COMPLETED | OUTPATIENT
Start: 2023-07-21 | End: 2023-07-21

## 2023-07-21 RX ADMIN — LIDOCAINE 1 PATCH: 4 CREAM TOPICAL at 06:56

## 2023-07-21 RX ADMIN — HYDROMORPHONE HYDROCHLORIDE 2 MILLIGRAM(S): 2 INJECTION INTRAMUSCULAR; INTRAVENOUS; SUBCUTANEOUS at 05:45

## 2023-07-21 RX ADMIN — ALBUTEROL 1 PUFF(S): 90 AEROSOL, METERED ORAL at 17:28

## 2023-07-21 RX ADMIN — HEPARIN SODIUM 5000 UNIT(S): 5000 INJECTION INTRAVENOUS; SUBCUTANEOUS at 14:45

## 2023-07-21 RX ADMIN — SODIUM CHLORIDE 4 MILLILITER(S): 9 INJECTION INTRAMUSCULAR; INTRAVENOUS; SUBCUTANEOUS at 09:50

## 2023-07-21 RX ADMIN — HEPARIN SODIUM 5000 UNIT(S): 5000 INJECTION INTRAVENOUS; SUBCUTANEOUS at 05:15

## 2023-07-21 RX ADMIN — ALBUTEROL 1 PUFF(S): 90 AEROSOL, METERED ORAL at 00:02

## 2023-07-21 RX ADMIN — Medication 15 MILLIGRAM(S): at 05:15

## 2023-07-21 RX ADMIN — FAMOTIDINE 20 MILLIGRAM(S): 10 INJECTION INTRAVENOUS at 17:31

## 2023-07-21 RX ADMIN — Medication 15 MILLIGRAM(S): at 22:08

## 2023-07-21 RX ADMIN — HEPARIN SODIUM 5000 UNIT(S): 5000 INJECTION INTRAVENOUS; SUBCUTANEOUS at 22:08

## 2023-07-21 RX ADMIN — HYDROMORPHONE HYDROCHLORIDE 2 MILLIGRAM(S): 2 INJECTION INTRAMUSCULAR; INTRAVENOUS; SUBCUTANEOUS at 05:15

## 2023-07-21 RX ADMIN — Medication 15 MILLIGRAM(S): at 00:30

## 2023-07-21 RX ADMIN — SODIUM CHLORIDE 4 MILLILITER(S): 9 INJECTION INTRAMUSCULAR; INTRAVENOUS; SUBCUTANEOUS at 21:18

## 2023-07-21 RX ADMIN — Medication 650 MILLIGRAM(S): at 15:56

## 2023-07-21 RX ADMIN — Medication 1 MILLIGRAM(S): at 09:33

## 2023-07-21 RX ADMIN — FAMOTIDINE 20 MILLIGRAM(S): 10 INJECTION INTRAVENOUS at 05:16

## 2023-07-21 RX ADMIN — HYDROMORPHONE HYDROCHLORIDE 2 MILLIGRAM(S): 2 INJECTION INTRAMUSCULAR; INTRAVENOUS; SUBCUTANEOUS at 00:01

## 2023-07-21 RX ADMIN — Medication 650 MILLIGRAM(S): at 17:27

## 2023-07-21 RX ADMIN — ALBUTEROL 1 PUFF(S): 90 AEROSOL, METERED ORAL at 05:14

## 2023-07-21 RX ADMIN — Medication 15 MILLIGRAM(S): at 23:00

## 2023-07-21 RX ADMIN — Medication 15 MILLIGRAM(S): at 05:45

## 2023-07-21 RX ADMIN — HYDROMORPHONE HYDROCHLORIDE 2 MILLIGRAM(S): 2 INJECTION INTRAMUSCULAR; INTRAVENOUS; SUBCUTANEOUS at 00:31

## 2023-07-21 RX ADMIN — Medication 15 MILLIGRAM(S): at 00:00

## 2023-07-21 RX ADMIN — Medication 1 MILLIGRAM(S): at 20:06

## 2023-07-21 RX ADMIN — LIDOCAINE 1 PATCH: 4 CREAM TOPICAL at 17:31

## 2023-07-21 NOTE — PROGRESS NOTE ADULT - ASSESSMENT
ASSESSMENT AND PLAN:   Patient is a 77 y/o F S/P flexible bronchoscopy robotic assisted RVATS RUL Lobectomy and MLND on 7/17. Patient has passive air leak, was trailed on water seal on (7/18), placed back on suction due to increased pneumothorax. Patient placed back to water seal today (7/19). Will follow up cxray to assess status.     NEURO: Post-operative Pain. Pain control with Toradol, IV tylenol, Dilaudid and lidocaine patch.   RESPIRATORY: Patient saturating well on room air. Obtain CXR. Incentive spirometry. Continue bronchodilators. OOB to chair & ambulate w/ assistance. Chest tube – Pleurevac to water seal. Monitor output and cxray.   CARDIOVASCULAR: Hemodynamically stable. Continue monitoring.  RENAL: Monitor IOs and electrolytes.   GASTROINTESTINAL: Zofran and Reglan IV PRN for nausea. Regular consistency diet  HEMATOLOGIC: No signs of active bleeding. Monitor Hgb in CBC in AM  DVT prophylaxis with heparin subQ and SCDs.  INFECTIOUS DISEASE: All surgical sites appear clean. No signs of active infection.   ENDOCRINE: Monitor glucose fingersticks for goal 120-180.  ONCOLOGY: Lung nodule - Improved. S/P resection. Follow up final pathology.  Plan discussed with attending -   Dr Matt Mario    
ASSESSMENT AND PLAN:   Patient is a 79 y/o F S/P flexible bronchoscopy robotic assisted RVATS RUL Lobectomy and MLND on 7/17. Patient has passive air leak, was trailed on water seal on (7/18), placed back on suction due to increased pneumothorax.     7/21: CT placed to water seal, will obtain follow up xray, and continue to monitor for AL.      NEURO: Post-operative Pain. Pain control with Dilaudid and lidocaine patch.   RESPIRATORY: Patient saturating well on room air. Obtain CXR. Incentive spirometry. Continue bronchodilators. OOB to chair & ambulate w/ assistance. Chest tube placed to WS, AL.  CARDIOVASCULAR: Hemodynamically stable. Continue monitoring.  RENAL: Monitor IOs and electrolytes.   GASTROINTESTINAL: Zofran and Reglan IV PRN for nausea. Regular consistency diet  HEMATOLOGIC: No signs of active bleeding. Monitor Hgb in CBC in AM  DVT prophylaxis with heparin subQ and SCDs.  INFECTIOUS DISEASE: All surgical sites appear clean. No signs of active infection.   ENDOCRINE: Monitor glucose fingersticks for goal 120-180.  ONCOLOGY: Lung nodule - Improved. S/P resection. Follow up final pathology.  Plan discussed with attending -   Dr Matt Mario  
ASSESSMENT AND PLAN:   Patient is a 79 y/o F S/P flexible bronchoscopy robotic assisted RVATS RUL Lobectomy and MLND on 7/17. Patient has passive air leak, was trailed on water seal on (7/18), placed back on suction due to increased pneumothorax. CT to suction, will continue to monitor for AL.      NEURO: Post-operative Pain. Pain control with Toradol, IV tylenol, Dilaudid and lidocaine patch.   RESPIRATORY: Patient saturating well on room air. Obtain CXR. Incentive spirometry. Continue bronchodilators. OOB to chair & ambulate w/ assistance. Chest tube to WS, AL.  CARDIOVASCULAR: Hemodynamically stable. Continue monitoring.  RENAL: Monitor IOs and electrolytes.   GASTROINTESTINAL: Zofran and Reglan IV PRN for nausea. Regular consistency diet  HEMATOLOGIC: No signs of active bleeding. Monitor Hgb in CBC in AM  DVT prophylaxis with heparin subQ and SCDs.  INFECTIOUS DISEASE: All surgical sites appear clean. No signs of active infection.   ENDOCRINE: Monitor glucose fingersticks for goal 120-180.  ONCOLOGY: Lung nodule - Improved. S/P resection. Follow up final pathology.  Will discuss plan discussed with attending -   Dr Matt Mario

## 2023-07-22 ENCOUNTER — TRANSCRIPTION ENCOUNTER (OUTPATIENT)
Age: 78
End: 2023-07-22

## 2023-07-22 VITALS
TEMPERATURE: 98 F | RESPIRATION RATE: 18 BRPM | HEART RATE: 81 BPM | DIASTOLIC BLOOD PRESSURE: 65 MMHG | OXYGEN SATURATION: 95 % | SYSTOLIC BLOOD PRESSURE: 135 MMHG

## 2023-07-22 LAB
ANION GAP SERPL CALC-SCNC: 12 MMOL/L — SIGNIFICANT CHANGE UP (ref 7–14)
BUN SERPL-MCNC: 11 MG/DL — SIGNIFICANT CHANGE UP (ref 7–23)
CALCIUM SERPL-MCNC: 9.4 MG/DL — SIGNIFICANT CHANGE UP (ref 8.4–10.5)
CHLORIDE SERPL-SCNC: 100 MMOL/L — SIGNIFICANT CHANGE UP (ref 98–107)
CO2 SERPL-SCNC: 25 MMOL/L — SIGNIFICANT CHANGE UP (ref 22–31)
CREAT SERPL-MCNC: 0.61 MG/DL — SIGNIFICANT CHANGE UP (ref 0.5–1.3)
EGFR: 93 ML/MIN/1.73M2 — SIGNIFICANT CHANGE UP
GLUCOSE SERPL-MCNC: 111 MG/DL — HIGH (ref 70–99)
HCT VFR BLD CALC: 46.9 % — HIGH (ref 34.5–45)
HGB BLD-MCNC: 15.3 G/DL — SIGNIFICANT CHANGE UP (ref 11.5–15.5)
MAGNESIUM SERPL-MCNC: 2 MG/DL — SIGNIFICANT CHANGE UP (ref 1.6–2.6)
MCHC RBC-ENTMCNC: 31 PG — SIGNIFICANT CHANGE UP (ref 27–34)
MCHC RBC-ENTMCNC: 32.6 GM/DL — SIGNIFICANT CHANGE UP (ref 32–36)
MCV RBC AUTO: 94.9 FL — SIGNIFICANT CHANGE UP (ref 80–100)
NRBC # BLD: 0 /100 WBCS — SIGNIFICANT CHANGE UP (ref 0–0)
NRBC # FLD: 0 K/UL — SIGNIFICANT CHANGE UP (ref 0–0)
PHOSPHATE SERPL-MCNC: 3.7 MG/DL — SIGNIFICANT CHANGE UP (ref 2.5–4.5)
PLATELET # BLD AUTO: 262 K/UL — SIGNIFICANT CHANGE UP (ref 150–400)
POTASSIUM SERPL-MCNC: 4.6 MMOL/L — SIGNIFICANT CHANGE UP (ref 3.5–5.3)
POTASSIUM SERPL-SCNC: 4.6 MMOL/L — SIGNIFICANT CHANGE UP (ref 3.5–5.3)
RBC # BLD: 4.94 M/UL — SIGNIFICANT CHANGE UP (ref 3.8–5.2)
RBC # FLD: 12.3 % — SIGNIFICANT CHANGE UP (ref 10.3–14.5)
SODIUM SERPL-SCNC: 137 MMOL/L — SIGNIFICANT CHANGE UP (ref 135–145)
WBC # BLD: 7.25 K/UL — SIGNIFICANT CHANGE UP (ref 3.8–10.5)
WBC # FLD AUTO: 7.25 K/UL — SIGNIFICANT CHANGE UP (ref 3.8–10.5)

## 2023-07-22 PROCEDURE — 71045 X-RAY EXAM CHEST 1 VIEW: CPT | Mod: 26

## 2023-07-22 PROCEDURE — 71045 X-RAY EXAM CHEST 1 VIEW: CPT | Mod: 26,77,76

## 2023-07-22 RX ORDER — ACETAMINOPHEN 500 MG
2 TABLET ORAL
Qty: 0 | Refills: 0 | DISCHARGE
Start: 2023-07-22

## 2023-07-22 RX ORDER — POLYETHYLENE GLYCOL 3350 17 G/17G
17 POWDER, FOR SOLUTION ORAL
Qty: 0 | Refills: 0 | DISCHARGE
Start: 2023-07-22

## 2023-07-22 RX ORDER — ACETAMINOPHEN 500 MG
650 TABLET ORAL EVERY 6 HOURS
Refills: 0 | Status: DISCONTINUED | OUTPATIENT
Start: 2023-07-22 | End: 2023-07-22

## 2023-07-22 RX ORDER — HYDROMORPHONE HYDROCHLORIDE 2 MG/ML
1 INJECTION INTRAMUSCULAR; INTRAVENOUS; SUBCUTANEOUS
Qty: 30 | Refills: 0
Start: 2023-07-22 | End: 2023-07-26

## 2023-07-22 RX ORDER — SENNA PLUS 8.6 MG/1
2 TABLET ORAL
Qty: 0 | Refills: 0 | DISCHARGE
Start: 2023-07-22

## 2023-07-22 RX ADMIN — ALBUTEROL 1 PUFF(S): 90 AEROSOL, METERED ORAL at 10:24

## 2023-07-22 RX ADMIN — Medication 650 MILLIGRAM(S): at 14:32

## 2023-07-22 RX ADMIN — Medication 650 MILLIGRAM(S): at 13:32

## 2023-07-22 RX ADMIN — HEPARIN SODIUM 5000 UNIT(S): 5000 INJECTION INTRAVENOUS; SUBCUTANEOUS at 13:02

## 2023-07-22 RX ADMIN — FAMOTIDINE 20 MILLIGRAM(S): 10 INJECTION INTRAVENOUS at 05:19

## 2023-07-22 RX ADMIN — LIDOCAINE 1 PATCH: 4 CREAM TOPICAL at 17:29

## 2023-07-22 RX ADMIN — FAMOTIDINE 20 MILLIGRAM(S): 10 INJECTION INTRAVENOUS at 17:29

## 2023-07-22 RX ADMIN — Medication 1 MILLIGRAM(S): at 10:25

## 2023-07-22 RX ADMIN — HEPARIN SODIUM 5000 UNIT(S): 5000 INJECTION INTRAVENOUS; SUBCUTANEOUS at 05:19

## 2023-07-22 RX ADMIN — Medication 15 MILLIGRAM(S): at 05:19

## 2023-07-22 RX ADMIN — ALBUTEROL 1 PUFF(S): 90 AEROSOL, METERED ORAL at 14:57

## 2023-07-22 RX ADMIN — ALBUTEROL 1 PUFF(S): 90 AEROSOL, METERED ORAL at 02:27

## 2023-07-22 RX ADMIN — SODIUM CHLORIDE 4 MILLILITER(S): 9 INJECTION INTRAMUSCULAR; INTRAVENOUS; SUBCUTANEOUS at 09:49

## 2023-07-22 NOTE — DISCHARGE NOTE NURSING/CASE MANAGEMENT/SOCIAL WORK - NSDCFUADDAPPT_GEN_ALL_CORE_FT
Please follow up with Dr. Mario in 10-14 days, call for an appointment [phone].    Please follow up with your PCP in 2-3 weeks. Call for an appointment.

## 2023-07-22 NOTE — PROGRESS NOTE ADULT - SUBJECTIVE AND OBJECTIVE BOX
CHIEF COMPLAINT: FOLLOW UP IN ICU FOR POSTOPERATIVE CARE OF PATIENT WHO IS S/P LUNG RESECTION      PROCEDURES:    Robot-assisted RVATS RUL Lobectomy, MLND. 17-Jul-2023      ISSUES:   Lung nodule  Chest tube in place  Post-operative pain  Polycythemia vera   Active smoker    INTERVAL EVENTS:   Chest tube with airleak      HISTORY:   Patient reports moderate pain at chest wall incision sites which is worse with coughing and deep breathing without associated fever or dyspnea. Pain is improved with use of pain meds.     PHYSICAL EXAM:   Gen: Comfortable, No acute distress  Eyes: Sclera white, Conjunctiva normal, Eyelids normal, Pupils symmetrical   ENT: Mucous membranes moist,  ,  ,    Neck: Trachea midline,  ,  ,  ,  ,  ,    CV: Rate regular, Rhythm regular,  ,  ,    Resp: Breath sounds clear, No accessory muscles use, R chest tube in place,  ,    Abd: Soft, Non-distended, Non-tender, Bowel sounds normal,  ,  ,    Skin: Warm, No peripheral edema of lower extremities,  ,    : No arrington  Neuro: Moving all 4 extremities,    Psych: A&Ox3      ASSESSMENT AND PLAN:     NEURO:  Post-operative Pain - Stable. Pain control with oxycodone and Tylenol .              RESPIRATORY:  Stable on room air - Incentive spirometry. Chest PT and suctioning of secretions. Out of bed to chair and ambulate with assistance. Continuous pulse oximetry for support & to prevent decompensation.    Chest tube – Pleurevac regulated waterseal. Monitor chest tube output.   - Repeat CXR today to rule out pneumothorax      Smoker - stable.             CARDIOVASCULAR:  Hemodynamically stable - Not on pressors. Continue hemodynamic monitoring.  Telemetry (medical test) - Reviewed by me today independently. Normal sinus rhythm.                RENAL:  Stable - Monitor IOs and electrolytes. Keep K above 4.0 and Mg above 2.0.              GASTROINTESTINAL:  GI prophylaxis not indicated  Zofran and Reglan IV PRN for nausea  Regular consistency diet            HEMATOLOGIC:  No signs of active bleeding. Monitor Hgb in CBC in AM  DVT prophylaxis with heparin subQ and SCDs.      Polycythemia vera - stable. Monitor Hgb and Hct. Consider therapeutic phlebotomy if Hct greater than 55.          INFECTIOUS DISEASE:  All surgical sites appear clean. No signs of active infection. Will monitor for fever and leukocytosis.              ENDOCRINE:  Stable – Monitor glucose fingersticks for goal 120-180.               ONCOLOGY:  Lung nodule - Improved. S/P resection. Follow up final pathology.      Pertinent clinical, laboratory, radiographic, hemodynamic, echocardiographic, respiratory data, microbiologic data and chart were reviewed by myself and analyzed frequently throughout the course of the day and night by myself.    Plan discussed at length with the CTICU staff and Attending CT Surgeon -   Dr Matt Mario    Patient's status was discussed with patient at bedside.      ________________________________________________    _________________________  VITAL SIGNS:  Vital Signs Last 24 Hrs  T(C): 36.9 (18 Jul 2023 08:00), Max: 36.9 (18 Jul 2023 08:00)  T(F): 98.4 (18 Jul 2023 08:00), Max: 98.4 (18 Jul 2023 08:00)  HR: 86 (18 Jul 2023 12:00) (74 - 93)  BP: 102/55 (18 Jul 2023 08:00) (91/45 - 129/63)  BP(mean): 69 (18 Jul 2023 08:00) (60 - 90)  RR: 20 (18 Jul 2023 12:00) (13 - 25)  SpO2: 99% (18 Jul 2023 12:00) (92% - 100%)    Parameters below as of 18 Jul 2023 12:00  Patient On (Oxygen Delivery Method): room air      I/Os:   I&O's Detail    17 Jul 2023 07:01  -  18 Jul 2023 07:00  --------------------------------------------------------  IN:    IV PiggyBack: 200 mL    Lactated Ringers: 600 mL    Lactated Ringers Bolus: 250 mL    Oral Fluid: 200 mL  Total IN: 1250 mL    OUT:    Chest Tube (mL): 135 mL    Indwelling Catheter - Urethral (mL): 735 mL    Voided (mL): 300 mL  Total OUT: 1170 mL    Total NET: 80 mL      18 Jul 2023 07:01  -  18 Jul 2023 12:53  --------------------------------------------------------  IN:    Lactated Ringers: 60 mL  Total IN: 60 mL    OUT:    Chest Tube (mL): 20 mL  Total OUT: 20 mL    Total NET: 40 mL              MEDICATIONS:  MEDICATIONS  (STANDING):  acetaminophen     Tablet .. 650 milliGRAM(s) Oral every 6 hours  albuterol    90 MICROgram(s) HFA Inhaler 1 Puff(s) Inhalation every 6 hours  famotidine    Tablet 20 milliGRAM(s) Oral two times a day  heparin   Injectable 5000 Unit(s) SubCutaneous every 8 hours  lidocaine   4% Patch 1 Patch Transdermal every 24 hours  polyethylene glycol 3350 17 Gram(s) Oral daily  senna 2 Tablet(s) Oral at bedtime  sodium chloride 3%  Inhalation 4 milliLiter(s) Inhalation every 12 hours  varenicline 1 milliGRAM(s) Oral every 12 hours    MEDICATIONS  (PRN):  HYDROmorphone   Tablet 2 milliGRAM(s) Oral every 3 hours PRN Moderate to Severe Pain (4 - 10)  naloxone Injectable 0.1 milliGRAM(s) IV Push every 3 minutes PRN For ANY of the following changes in patient status:  A. RR LESS THAN 10 breaths per minute, B. Oxygen saturation LESS THAN 90%, C. Sedation score of 6  ondansetron Injectable 4 milliGRAM(s) IV Push every 6 hours PRN Nausea      LABS:  Laboratory data was independently reviewed by me today.                           15.8   13.25 )-----------( 224      ( 18 Jul 2023 02:51 )             47.6     07-18    132<L>  |  96<L>  |  16  ----------------------------<  147<H>  5.1   |  24  |  0.55    Ca    9.3      18 Jul 2023 02:51  Phos  3.7     07-18  Mg     2.00     07-18            Urinalysis Basic - ( 18 Jul 2023 02:51 )    Color: x / Appearance: x / SG: x / pH: x  Gluc: 147 mg/dL / Ketone: x  / Bili: x / Urobili: x   Blood: x / Protein: x / Nitrite: x   Leuk Esterase: x / RBC: x / WBC x   Sq Epi: x / Non Sq Epi: x / Bacteria: x        RADIOLOGY:   Radiology images were independently reviewed by me today. Reports were reviewed by me today.    Xray Chest 1 View AP/PA:   ACC: [id number] EXAM:  XR CHEST AP OR PA 1V   ORDERED BY: OCTAVIO MANCUSO     PROCEDURE DATE:  07/18/2023          INTERPRETATION:  CLINICAL INFORMATION: Post right thoracotomy.    TIME OF EXAMINATION: July 18 at 5:04 AM    EXAM: Portable chest    FINDINGS:  Right-sided chest tube in position. Lungs are clear. No effusions but   there is a small right pneumothorax.        COMPARISON: July 17        IMPRESSION: Status post right thoracotomy with chest tube and apical   pneumothorax.    --- End of Report ---            VINCENT PAYAN MD; Attending Radiologist  This document has been electronically signed. Jul 18 2023 11:55AM (07-18-23 @ 05:44)  Xray Chest 1 View- PORTABLE-Urgent:   ACC: [id number] EXAM:  XR CHEST PORTABLE URGENT 1V   ORDERED BY: OCTAVIO MANCUSO     PROCEDURE DATE:  07/17/2023          INTERPRETATION:  EXAMINATION: XR CHEST URGENT    CLINICAL INDICATION: s/p RVATS right upper lobectomy    TECHNIQUE: Single frontal, portable view of the chest was obtained.    COMPARISON: CT chest 5/16/2023, PET/CT 6/5/2023.    FINDINGS:  Support devices: Right chest tube. Right midlung field chain sutures.  Normal cardiac silhouette.  Small left pleural effusion with associated compressive atelectasis.  Status post right upper lobectomy. Small right apical pneumothorax.  No acute bony abnormality.    IMPRESSION:  Small right apical pneumothorax chest tube in place.    Small left pleural effusion with associated compressive atelectasis.    --- End of Report ---          VINCENT SIMONS MD; Resident Radiologist  This document has been electronically signed.  SHERRIE IQBAL DO; Attending Radiologist  This document has been electronically signed. Jul 17 2023  2:38PM (07-17-23 @ 12:36)  
Anesthesia Pain Management Service    SUBJECTIVE: Patient states the IV PCA helps but the pain relief does not last long enough. Reports nausea and vomiting with PO Oxycodone in the past. Denies nausea and vomiting with the IV Dilaudid PCA.  Pain Scale Score	At rest: 6-7/10___ 	With Activity: ___ 	[X ] Refer to charted pain scores    THERAPY:    [ ] IV PCA Morphine		[ ] 5 mg/mL	[ ] 1 mg/mL  [X ] IV PCA Hydromorphone	[ ] 5 mg/mL	[X ] 1 mg/mL  [ ] IV PCA Fentanyl		[ ] 50 micrograms/mL    Demand dose __0.2_ lockout __6_ (minutes) Continuous Rate _0__ Total: _3.4__   mg used (in past 24 hrs)      MEDICATIONS  (STANDING):  acetaminophen     Tablet .. 650 milliGRAM(s) Oral every 6 hours  albuterol    90 MICROgram(s) HFA Inhaler 1 Puff(s) Inhalation every 6 hours  famotidine    Tablet 20 milliGRAM(s) Oral two times a day  heparin   Injectable 5000 Unit(s) SubCutaneous every 8 hours  lactated ringers. 1000 milliLiter(s) (30 mL/Hr) IV Continuous <Continuous>  lidocaine   4% Patch 1 Patch Transdermal every 24 hours  polyethylene glycol 3350 17 Gram(s) Oral daily  senna 2 Tablet(s) Oral at bedtime  sodium chloride 3%  Inhalation 4 milliLiter(s) Inhalation every 12 hours  varenicline 1 milliGRAM(s) Oral every 12 hours    MEDICATIONS  (PRN):  HYDROmorphone   Tablet 2 milliGRAM(s) Oral every 3 hours PRN Moderate to Severe Pain (4 - 10)  naloxone Injectable 0.1 milliGRAM(s) IV Push every 3 minutes PRN For ANY of the following changes in patient status:  A. RR LESS THAN 10 breaths per minute, B. Oxygen saturation LESS THAN 90%, C. Sedation score of 6  ondansetron Injectable 4 milliGRAM(s) IV Push every 6 hours PRN Nausea      OBJECTIVE: Patient sitting up in chair. CTX1 in place.    Sedation Score:	[ X] Alert	[ ] Drowsy 	[ ] Arousable	[ ] Asleep	[ ] Unresponsive    Side Effects:	[X ] None	[ ] Nausea	[ ] Vomiting	[ ] Pruritus  		[ ] Other:    Vital Signs Last 24 Hrs  T(C): 36.9 (18 Jul 2023 08:00), Max: 36.9 (18 Jul 2023 08:00)  T(F): 98.4 (18 Jul 2023 08:00), Max: 98.4 (18 Jul 2023 08:00)  HR: 83 (18 Jul 2023 08:00) (74 - 93)  BP: 102/55 (18 Jul 2023 08:00) (91/45 - 130/64)  BP(mean): 69 (18 Jul 2023 08:00) (60 - 94)  RR: 15 (18 Jul 2023 08:00) (13 - 25)  SpO2: 97% (18 Jul 2023 08:00) (91% - 100%)    Parameters below as of 18 Jul 2023 08:00  Patient On (Oxygen Delivery Method): room air        ASSESSMENT/ PLAN    Therapy to  be:	[ ] Continue   [ X] Discontinued   [X ] Change to prn Analgesics    Documentation and Verification of current medications:   [X] Done	[ ] Not done, not elligible    Comments: IV PCA discontinued. Plan discussed with primary team. PRN Oral/IV opioids and/or Adjuvant non-opioid medication to be ordered at this point.    Progress Note written now but Patient was seen earlier.
    CHIEF COMPLAINT: FOLLOW UP IN ICU FOR POSTOPERATIVE CARE OF PATIENT WHO IS S/P RVATS, RUL lobectomy      PROCEDURES:     FB, Robot-assisted RVATS RUL Lobectomy, MLND. 17-Jul-2023      ISSUES:     Lung nodule  Chest tube in place  Post-operative pain  Active smoker    INTERVAL EVENTS:   OR today. Extubated in OR. Transferred to CTICU.      HISTORY:   Patient reports moderate pain at chest wall incision sites which is worse with coughing and deep breathing without associated fever or dyspnea. Pain is improved with use of pain meds.     PHYSICAL EXAM:   Gen: Comfortable, No acute distress  Eyes: Sclera white, Conjunctiva normal, Eyelids normal, Pupils symmetrical   ENT: Mucous membranes moist,  ,  ,    Neck: Trachea midline,  ,  ,  ,  ,  ,    CV: Rate regular, Rhythm regular,  ,  ,    Resp: Breath sounds clear, No accessory muscles use, R chest tube in place,  ,    Abd: Soft, Non-distended, Non-tender, Bowel sounds normal,  ,  ,    Skin: Warm, No peripheral edema of lower extremities,  ,    : No arrington  Neuro: Moving all 4 extremities,    Psych: A&Ox3      ASSESSMENT AND PLAN:     NEURO:  Post-operative Pain - Stable. Pain control with PCA and Tylenol IV PRN.              RESPIRATORY:  Hypoxia - Wean nasal cannula for goal O2sat above 92. Obtain CXR. Incentive spirometry. Chest PT and frequent suctioning. Continue bronchodilators. OOB to chair & ambulate w/ assistance. Continuous pulse oximetry for support & to prevent decompensation.       Chest tube – Pleurevac regulated suctioning. Monitor chest tube output.                CARDIOVASCULAR:  Hemodynamically stable - Not on pressors. Continue hemodynamic monitoring.  Telemetry (medical test) - Reviewed by me today independently. Normal sinus rhythm.                RENAL:  Stable - Monitor IOs and electrolytes. Keep K above 4.0 and Mg above 2.0.              GASTROINTESTINAL:  GI prophylaxis not indicated  Zofran and Reglan IV PRN for nausea  Regular consistency diet            HEMATOLOGIC:  No signs of active bleeding. Monitor Hgb in CBC in AM  DVT prophylaxis with heparin subQ and SCDs.               INFECTIOUS DISEASE:  All surgical sites appear clean. No signs of active infection. Will monitor for fever and leukocytosis.              ENDOCRINE:  Stable – Monitor glucose fingersticks for goal 120-180.               ONCOLOGY:  Lung nodule - Improved. S/P resection. Follow up final pathology.      Pertinent clinical, laboratory, radiographic, hemodynamic, echocardiographic, respiratory data, microbiologic data and chart were reviewed by myself and analyzed frequently throughout the course of the day and night by myself.    Plan discussed at length with the CTICU staff and Attending CT Surgeon -   Dr Matt Mario    Patient's status was discussed with patient at bedside.    ________________________________________________      _________________________  VITAL SIGNS:  Vital Signs Last 24 Hrs  T(C): 36.9 (17 Jul 2023 06:07), Max: 36.9 (17 Jul 2023 06:07)  T(F): 98.4 (17 Jul 2023 06:07), Max: 98.4 (17 Jul 2023 06:07)  HR: 77 (17 Jul 2023 06:07) (77 - 77)  BP: 129/59 (17 Jul 2023 06:07) (129/59 - 129/59)  BP(mean): --  RR: 18 (17 Jul 2023 06:07) (18 - 18)  SpO2: 99% (17 Jul 2023 06:07) (99% - 99%)      I/Os:   I&O's Detail          MEDICATIONS:  MEDICATIONS  (STANDING):  albuterol    90 MICROgram(s) HFA Inhaler 1 Puff(s) Inhalation every 6 hours  heparin   Injectable 5000 Unit(s) SubCutaneous every 8 hours  HYDROmorphone PCA (1 mG/mL) 30 milliLiter(s) PCA Continuous PCA Continuous  lactated ringers. 1000 milliLiter(s) (30 mL/Hr) IV Continuous <Continuous>  polyethylene glycol 3350 17 Gram(s) Oral daily  senna 2 Tablet(s) Oral at bedtime  sodium chloride 3%  Inhalation 4 milliLiter(s) Inhalation every 12 hours    MEDICATIONS  (PRN):  naloxone Injectable 0.1 milliGRAM(s) IV Push every 3 minutes PRN For ANY of the following changes in patient status:  A. RR LESS THAN 10 breaths per minute, B. Oxygen saturation LESS THAN 90%, C. Sedation score of 6  ondansetron Injectable 4 milliGRAM(s) IV Push every 6 hours PRN Nausea      LABS:  Laboratory data was independently reviewed by me today.                       RADIOLOGY:   Radiology images were independently reviewed by me today. Reports were reviewed by me today.        
Patient seen and examined at bedside, resting comfortably on RA, in NAD.  Patient states she feels well, is just eager to have the tube removed.  Denies acute overnight complaints or other active complaints.    Vital Signs:  Vital Signs Last 24 Hrs  T(C): 36.7 (07-22-23 @ 09:14), Max: 36.9 (07-22-23 @ 00:00)  T(F): 98 (07-22-23 @ 09:14), Max: 98.4 (07-22-23 @ 00:00)  HR: 80 (07-22-23 @ 09:14) (75 - 91)  BP: 113/54 (07-22-23 @ 09:14) (111/51 - 129/55)  RR: 18 (07-22-23 @ 09:14) (18 - 18)  SpO2: 98% (07-22-23 @ 09:14) (93% - 98%)     Telemetry/Alarms:  PHYSICAL EXAM:  General: Well appearing, NAD  Eyes: Vision grossly intact, EOMI  ENMT: Hearing grossly intact. Airway grossly patent, no stridor  Neck: Neck supple, trachea midline  Cardiovascular: RRR, well perfused  Respiratory: Breathing comfortably on RA, no respiratory distress, no accessory muscle use.  Gastrointestinal: Soft, NT, ND  Extremities: CAREY x4  Vascular: Well perfused  Neurological: Nonfocal, no deficits  Psychiatric: Appropriate affect  Tubes: R CT to WS, no AL, 20cc output.    Relevant labs, radiology and Medications reviewed                        15.3   7.25  )-----------( 262      ( 22 Jul 2023 06:00 )             46.9     07-22    137  |  100  |  11  ----------------------------<  111<H>  4.6   |  25  |  0.61    Ca    9.4      22 Jul 2023 06:00  Phos  3.7     07-22  Mg     2.00     07-22        MEDICATIONS  (STANDING):  albuterol    90 MICROgram(s) HFA Inhaler 1 Puff(s) Inhalation every 6 hours  famotidine    Tablet 20 milliGRAM(s) Oral two times a day  heparin   Injectable 5000 Unit(s) SubCutaneous every 8 hours  lidocaine   4% Patch 1 Patch Transdermal every 24 hours  polyethylene glycol 3350 17 Gram(s) Oral daily  senna 2 Tablet(s) Oral at bedtime  sodium chloride 3%  Inhalation 4 milliLiter(s) Inhalation every 12 hours  varenicline 1 milliGRAM(s) Oral every 12 hours    MEDICATIONS  (PRN):  HYDROmorphone   Tablet 2 milliGRAM(s) Oral every 3 hours PRN Moderate to Severe Pain (4 - 10)  naloxone Injectable 0.1 milliGRAM(s) IV Push every 3 minutes PRN For ANY of the following changes in patient status:  A. RR LESS THAN 10 breaths per minute, B. Oxygen saturation LESS THAN 90%, C. Sedation score of 6  ondansetron Injectable 4 milliGRAM(s) IV Push every 6 hours PRN Nausea    Pertinent Physical Exam  I&O's Summary    21 Jul 2023 07:01  -  22 Jul 2023 07:00  --------------------------------------------------------  IN: 330 mL / OUT: 730 mL / NET: -400 mL        Assessment  77 y/o F S/P flexible bronchoscopy robotic assisted RVATS RUL Lobectomy and MLND on 7/17. Patient has passive air leak, was trailed on water seal on (7/18), placed back on suction due to increased pneumothorax.   7/21: CT placed to water seal, will obtain follow up xray, and continue to monitor for AL.  7/22: No AL, CXR stable. Chest tube clamped, will f/u 4hr CXR, if stable will keep clamped overnight with plan to dc in AM.    PLAN  Neuro: Pain management  Pulm: Encourage coughing, deep breathing and use of incentive spirometry. Daily CXR.   Cardio: Monitor telemetry/alarms  GI: Tolerating diet. Continue stool softeners.  Renal: monitor urine output, supplement electrolytes as needed  Vasc: Heparin SC/SCDs for DVT prophylaxis  Heme: Stable H/H. .   ID: Off antibiotics. Stable.  Therapy: OOB/ambulate  Tubes: Monitor Chest tube output  Disposition: Aim to D/C to home once chest tube removed and CXR stable.  Discussed with Cardiothoracic Team at AM rounds.  
Thoracic Surgery Daily Progress Note   MONI BENAVIDEZ | [id number] | FOZIA Mario  --------------------------------------------------------------------------------------------------------------------  SUBJECTIVE / 24H EVENTS  Patient seen and examined on morning rounds. No acute events overnight. Patient resting comfortably in bed. Chest tube with small airleak.  --------------------------------------------------------------------------------------------------------------------  OBJECTIVE:    VITAL SIGNS:  T(C): 36.4 (07-21-23 @ 04:58), Max: 36.8 (07-20-23 @ 19:35)  HR: 81 (07-21-23 @ 04:58) (75 - 87)  BP: 107/50 (07-21-23 @ 04:58) (107/50 - 150/69)  RR: 18 (07-21-23 @ 04:58) (18 - 19)  SpO2: 95% (07-21-23 @ 04:58) (92% - 97%)  Daily     Daily       PHYSICAL EXAM:  Gen: NAD  Resp: Respirations unlabored. Bilateral chest rise.   Card: RRR.   GI: Soft. Nontender. Nondistended.   Skin: Warm, well perfused, no masses or organomegaly  EXT: No clubbing, cyanosis, or edema  Tubes: R chest tube currently to suction. Small AL. 50 ml drainage.   Incisions: Clean and dry, no signs of infection.       07-20-23 @ 07:01  -  07-21-23 @ 07:00  --------------------------------------------------------  IN:    Oral Fluid: 700 mL  Total IN: 700 mL    OUT:    Chest Tube (mL): 50 mL    Voided (mL): 850 mL  Total OUT: 900 mL    Total NET: -200 mL      LAB VALUES:  07-21    133<L>  |  97<L>  |  14  ----------------------------<  112<H>  4.5   |  24  |  0.56    Ca    9.4      21 Jul 2023 05:35  Phos  3.8     07-21  Mg     1.90     07-21                                 14.8   7.85  )-----------( 257      ( 21 Jul 2023 05:35 )             43.7       Urinalysis Basic - ( 21 Jul 2023 05:35 )    Color: x / Appearance: x / SG: x / pH: x  Gluc: 112 mg/dL / Ketone: x  / Bili: x / Urobili: x   Blood: x / Protein: x / Nitrite: x   Leuk Esterase: x / RBC: x / WBC x   Sq Epi: x / Non Sq Epi: x / Bacteria: x    MICROBIOLOGY:  No new microbiology data for review.     RADIOLOGY:  Cxray unchanged from prior exams.     MEDICATIONS  (STANDING):  albuterol    90 MICROgram(s) HFA Inhaler 1 Puff(s) Inhalation every 6 hours  famotidine    Tablet 20 milliGRAM(s) Oral two times a day  heparin   Injectable 5000 Unit(s) SubCutaneous every 8 hours  lidocaine   4% Patch 1 Patch Transdermal every 24 hours  polyethylene glycol 3350 17 Gram(s) Oral daily  senna 2 Tablet(s) Oral at bedtime  sodium chloride 3%  Inhalation 4 milliLiter(s) Inhalation every 12 hours  varenicline 1 milliGRAM(s) Oral every 12 hours    MEDICATIONS  (PRN):  HYDROmorphone   Tablet 2 milliGRAM(s) Oral every 3 hours PRN Moderate to Severe Pain (4 - 10)  naloxone Injectable 0.1 milliGRAM(s) IV Push every 3 minutes PRN For ANY of the following changes in patient status:  A. RR LESS THAN 10 breaths per minute, B. Oxygen saturation LESS THAN 90%, C. Sedation score of 6  ondansetron Injectable 4 milliGRAM(s) IV Push every 6 hours PRN Nausea     
Thoracic Surgery Daily Progress Note   MONI BENAVIDEZ | [id number] | FOZIA Mario  --------------------------------------------------------------------------------------------------------------------  SUBJECTIVE / 24H EVENTS  Patient seen and examined on morning rounds. No acute events overnight. Passive air leak.   --------------------------------------------------------------------------------------------------------------------  OBJECTIVE:    VITAL SIGNS:  T(C): 36.5 (07-19-23 @ 07:45), Max: 37.2 (07-18-23 @ 12:00)  HR: 77 (07-19-23 @ 07:45) (76 - 86)  BP: 124/54 (07-19-23 @ 07:45) (98/58 - 125/63)  RR: 19 (07-19-23 @ 07:45) (18 - 23)  SpO2: 94% (07-19-23 @ 07:45) (92% - 99%)  Daily     Daily     PHYSICAL EXAM:  Gen: NAD  Resp: Respirations unlabored.   Card: RRR.    GI: Soft. Nontender. Nondistended.   Ext: Warm, well perfused  Tubes: CT placed to WS.   Incisions: No erythema or signs of infection.     07-18-23 @ 07:01  -  07-19-23 @ 07:00  --------------------------------------------------------  IN:    Albumin 5%  - 250 mL: 250 mL    Lactated Ringers: 120 mL    Oral Fluid: 755 mL  Total IN: 1125 mL    OUT:    Chest Tube (mL): 100 mL    Voided (mL): 1000 mL  Total OUT: 1100 mL    Total NET: 25 mL    LAB VALUES:  07-19    127<L>  |  95<L>  |  16  ----------------------------<  113<H>  4.5   |  26  |  0.58    Ca    9.3      19 Jul 2023 06:51  Phos  2.6     07-19  Mg     1.90     07-19                                 15.4   10.15 )-----------( 204      ( 19 Jul 2023 06:51 )             47.6     Urinalysis Basic - ( 19 Jul 2023 06:51 )    Color: x / Appearance: x / SG: x / pH: x  Gluc: 113 mg/dL / Ketone: x  / Bili: x / Urobili: x   Blood: x / Protein: x / Nitrite: x   Leuk Esterase: x / RBC: x / WBC x   Sq Epi: x / Non Sq Epi: x / Bacteria: x    MICROBIOLOGY:  No new microbiology data for review.     RADIOLOGY:  PACS Image: Image(s) Available (07-18-23 @ 12:47)  Trace right pneumothorax improved from yesterday. Small L effusion. Obtaining repeat cxray after CT placed to .     MEDICATIONS  (STANDING):  acetaminophen   IVPB .. 1000 milliGRAM(s) IV Intermittent once  albuterol    90 MICROgram(s) HFA Inhaler 1 Puff(s) Inhalation every 6 hours  famotidine    Tablet 20 milliGRAM(s) Oral two times a day  heparin   Injectable 5000 Unit(s) SubCutaneous every 8 hours  ketorolac   Injectable 15 milliGRAM(s) IV Push every 6 hours  lidocaine   4% Patch 1 Patch Transdermal every 24 hours  polyethylene glycol 3350 17 Gram(s) Oral daily  senna 2 Tablet(s) Oral at bedtime  sodium chloride 3%  Inhalation 4 milliLiter(s) Inhalation every 12 hours  varenicline 1 milliGRAM(s) Oral every 12 hours    MEDICATIONS  (PRN):  HYDROmorphone   Tablet 2 milliGRAM(s) Oral every 3 hours PRN Moderate to Severe Pain (4 - 10)  naloxone Injectable 0.1 milliGRAM(s) IV Push every 3 minutes PRN For ANY of the following changes in patient status:  A. RR LESS THAN 10 breaths per minute, B. Oxygen saturation LESS THAN 90%, C. Sedation score of 6  ondansetron Injectable 4 milliGRAM(s) IV Push every 6 hours PRN Nausea     
Thoracic Surgery Daily Progress Note   MONI BENAVIDEZ | [id number] | [facility] Mario  --------------------------------------------------------------------------------------------------------------------  SUBJECTIVE / 24H EVENTS  Patient seen and examined on morning rounds. No acute events overnight. Patient resting comfortably in bed.   --------------------------------------------------------------------------------------------------------------------  OBJECTIVE:    VITAL SIGNS:  T(C): 36.3 (07-20-23 @ 08:00), Max: 36.7 (07-20-23 @ 04:57)  HR: 80 (07-20-23 @ 10:29) (77 - 83)  BP: 120/49 (07-20-23 @ 08:00) (112/55 - 132/60)  RR: 18 (07-20-23 @ 08:00) (18 - 18)  SpO2: 97% (07-20-23 @ 08:00) (93% - 98%)  Daily     Daily       PHYSICAL EXAM:  Gen: NAD  LS: Respirations unlabored.   Card: RRR.   GI: Soft. Nontender. Nondistended.   Ext: Warm, well perfused  Tubes: CT to suction, will monitor. AL.   Incisions: Clean and dry, no signs of infection.       07-19-23 @ 07:01  -  07-20-23 @ 07:00  --------------------------------------------------------  IN:    Oral Fluid: 1250 mL  Total IN: 1250 mL    OUT:    Chest Tube (mL): 50 mL    Voided (mL): 1550 mL  Total OUT: 1600 mL    Total NET: -350 mL    LAB VALUES:  07-20    130<L>  |  95<L>  |  18  ----------------------------<  107<H>  4.5   |  24  |  0.56    Ca    9.4      20 Jul 2023 05:18  Phos  3.2     07-20  Mg     2.00     07-20                                 14.3   8.95  )-----------( 226      ( 20 Jul 2023 05:18 )             44.1     Urinalysis Basic - ( 20 Jul 2023 05:18 )    Color: x / Appearance: x / SG: x / pH: x  Gluc: 107 mg/dL / Ketone: x  / Bili: x / Urobili: x   Blood: x / Protein: x / Nitrite: x   Leuk Esterase: x / RBC: x / WBC x   Sq Epi: x / Non Sq Epi: x / Bacteria: x    MICROBIOLOGY:  No new microbiology data for review.     RADIOLOGY:  PACS Image: Image(s) Available (07-20-23 @ 07:37)  PACS Image: Image(s) Available (07-19-23 @ 14:09)  Pneumothorax (R) improved from yesterday after placing CT back to suction.     MEDICATIONS  (STANDING):  albuterol    90 MICROgram(s) HFA Inhaler 1 Puff(s) Inhalation every 6 hours  famotidine    Tablet 20 milliGRAM(s) Oral two times a day  heparin   Injectable 5000 Unit(s) SubCutaneous every 8 hours  ketorolac   Injectable 15 milliGRAM(s) IV Push every 6 hours  lidocaine   4% Patch 1 Patch Transdermal every 24 hours  polyethylene glycol 3350 17 Gram(s) Oral daily  senna 2 Tablet(s) Oral at bedtime  sodium chloride 3%  Inhalation 4 milliLiter(s) Inhalation every 12 hours  varenicline 1 milliGRAM(s) Oral every 12 hours    MEDICATIONS  (PRN):  HYDROmorphone   Tablet 2 milliGRAM(s) Oral every 3 hours PRN Moderate to Severe Pain (4 - 10)  naloxone Injectable 0.1 milliGRAM(s) IV Push every 3 minutes PRN For ANY of the following changes in patient status:  A. RR LESS THAN 10 breaths per minute, B. Oxygen saturation LESS THAN 90%, C. Sedation score of 6  ondansetron Injectable 4 milliGRAM(s) IV Push every 6 hours PRN Nausea

## 2023-07-22 NOTE — PROGRESS NOTE ADULT - PROVIDER SPECIALTY LIST ADULT
Critical Care
Thoracic Surgery
Thoracic Surgery
Critical Care
Pain Medicine
Thoracic Surgery
Thoracic Surgery

## 2023-07-22 NOTE — DISCHARGE NOTE NURSING/CASE MANAGEMENT/SOCIAL WORK - PATIENT PORTAL LINK FT
You can access the FollowMyHealth Patient Portal offered by [facility] by registering at the following website: [email or web address]. By joining [employer]’s FollowMyHealth portal, you will also be able to view your health information using other applications (apps) compatible with our system.

## 2023-07-22 NOTE — DISCHARGE NOTE NURSING/CASE MANAGEMENT/SOCIAL WORK - NSDCPNINST_GEN_ALL_CORE
Any bleeding from surgical site, pus draining, or temperature greater than 100.4 please call your doctor. Continue to ambulate as instructed and follow up with all provided appointments.

## 2023-07-24 PROBLEM — Z87.891 PERSONAL HISTORY OF NICOTINE DEPENDENCE: Chronic | Status: ACTIVE | Noted: 2023-07-10

## 2023-07-24 PROBLEM — E66.9 OBESITY, UNSPECIFIED: Chronic | Status: ACTIVE | Noted: 2023-07-10

## 2023-07-24 PROBLEM — Z86.2 PERSONAL HISTORY OF DISEASES OF THE BLOOD AND BLOOD-FORMING ORGANS AND CERTAIN DISORDERS INVOLVING THE IMMUNE MECHANISM: Chronic | Status: ACTIVE | Noted: 2023-07-10

## 2023-07-24 PROBLEM — R91.1 SOLITARY PULMONARY NODULE: Chronic | Status: ACTIVE | Noted: 2023-07-10

## 2023-07-25 LAB — SURGICAL PATHOLOGY STUDY: SIGNIFICANT CHANGE UP

## 2023-08-09 ENCOUNTER — APPOINTMENT (OUTPATIENT)
Dept: THORACIC SURGERY | Facility: CLINIC | Age: 78
End: 2023-08-09
Payer: COMMERCIAL

## 2023-08-09 ENCOUNTER — NON-APPOINTMENT (OUTPATIENT)
Age: 78
End: 2023-08-09

## 2023-08-09 VITALS
SYSTOLIC BLOOD PRESSURE: 116 MMHG | TEMPERATURE: 97.7 F | DIASTOLIC BLOOD PRESSURE: 66 MMHG | HEIGHT: 67 IN | WEIGHT: 207 LBS | BODY MASS INDEX: 32.49 KG/M2 | OXYGEN SATURATION: 96 % | HEART RATE: 71 BPM | RESPIRATION RATE: 17 BRPM

## 2023-08-09 DIAGNOSIS — J93.9 PNEUMOTHORAX, UNSPECIFIED: ICD-10-CM

## 2023-08-09 PROCEDURE — 99024 POSTOP FOLLOW-UP VISIT: CPT

## 2023-08-09 NOTE — ASSESSMENT
[FreeTextEntry1] : Ms. MONI BENAVIDEZ, 78 year old female, Current smoker (1 PPD x 50 years); started cessation treatment June, 2023, now down to 2 cigs daily. On Varenicline. Pmhx of HTN, diverticular disease, osteoporosis. Recent CT revealing multiple lung nodule.  PET/CT on 06/05/2023 (Lis[facility]): - There is an intensely FDG avid right upper lobe mass (SUV 9.3, 1.6 x 1.9 cm, series 3 image 79). This is suspicious for malignancy.  - There are atelectatic changes in the anterior right upper lobe and the lingula without corresponding FDG activity.  - There is an FDG avid right axillary lymph node (SUV 1.9, 1.5 cm, series 3 image 74), likely reactive in nature.  PFTs on 06/08/2023: FVC 2.07, 68%; FEV1 1.49, 64%; DLCO 17.64, 79%  Now s/p Bronchoscopy; right video thoracoscopy; robotic assisted  right upper lobectomy with lymph node dissection, hilar and mediastinal ON 07/17/2023. Path of RUL reveals adenocarcinoma, solid predominant, with acinar component, spanning 1.8 cm in greatest dimension. Lymphatic invasion is identified.  The bronchial margin is negative for tumor and the nearest margin is 3.1 cm. qB1obL6 (Stage IA2). Path of LNs level 4, 7, 8, 10, 11 Negative for carcinoma.   Post op course complicated by persistent air leak. Chest tube subsequently removed on POD 5  CXR on 08/07/2023 (Liseth): - The lungs are well expanded. - Elevation of the right hemidiaphragm is noted, which has remained unchanged since prior examination.  - Small Right apical pnuemo  She presents today for post- op visit. Today, patient denies worsening SOB, chest pain, hemoptysis. Good activity tolerance. No fever, chills, night sweats, lightheadedness or dizziness.  I have independently reviewed the medical records and imaging at the time of this office consultation. CXR on 8/7 reviewed. Small pneumo noted, however patient is overall feeling with no worsening shortness of breath; Increasing activity as tolerated. Plan to return to clinic in 3 weeks with repeat CXR. She is agreeable.    Recommendations reviewed with patient during this office visit, and all questions answered; Patient instructed on the importance of follow up and verbalizes understanding.  I, KERI Palacio, personally performed the evaluation and management (E/M) services for this established patient. That E/M includes conducting the examination, assessing all new/exacerbated conditions, and establishing a new plan of care. Today, My ACP, Nelli Holder, was here to observe my evaluation and management services for this patient to be followed going forward.

## 2023-08-09 NOTE — PHYSICAL EXAM
[] : no respiratory distress [Respiration, Rhythm And Depth] : normal respiratory rhythm and effort [Exaggerated Use Of Accessory Muscles For Inspiration] : no accessory muscle use [Auscultation Breath Sounds / Voice Sounds] : lungs were clear to auscultation bilaterally [Heart Rate And Rhythm] : heart rate was normal and rhythm regular [Clean] : clean [Dry] : dry [Healing Well] : healing well [No Edema] : no edema [Bleeding] : no active bleeding [Foul Odor] : no foul smell [Purulent Drainage] : no purulent drainage [Serosanguinous Drainage] : no serosanguinous drainage [Erythema] : not erythematous [Warm] : not warm [Tender] : not tender

## 2023-08-09 NOTE — REASON FOR VISIT
[Family Member] : family member [de-identified] : Bronchoscopy; right video thoracoscopy; roboticassisted  right upper lobectomy with lymph node dissection, hilar and mediastinal. [de-identified] : 07/17/2023

## 2023-08-09 NOTE — CONSULT LETTER
[FreeTextEntry2] : self referred [FreeTextEntry3] : Matt Mario MD, FACS\par , Division of Thoracic Surgery\par [facility]\par Thoracic Surgery\par [facility]\par Department of Cardiovascular & Thoracic Surgery\par \par [facility] [employer] [facility]

## 2023-08-09 NOTE — DISCUSSION/SUMMARY
[Doing Well] : is doing well [No Sign of Infection] : is showing no signs of infection [3] : 3 [Remove Sutures/Staples] : removed sutures/staples [Clinical Recheck] : clinical recheck [de-identified] : God pain control with Tylenol.

## 2023-09-06 ENCOUNTER — APPOINTMENT (OUTPATIENT)
Dept: THORACIC SURGERY | Facility: CLINIC | Age: 78
End: 2023-09-06
Payer: COMMERCIAL

## 2023-09-06 ENCOUNTER — NON-APPOINTMENT (OUTPATIENT)
Age: 78
End: 2023-09-06

## 2023-09-06 VITALS
RESPIRATION RATE: 18 BRPM | DIASTOLIC BLOOD PRESSURE: 81 MMHG | BODY MASS INDEX: 32.96 KG/M2 | TEMPERATURE: 98.6 F | OXYGEN SATURATION: 95 % | SYSTOLIC BLOOD PRESSURE: 133 MMHG | HEART RATE: 83 BPM | WEIGHT: 210 LBS | HEIGHT: 67 IN

## 2023-09-06 PROCEDURE — 99024 POSTOP FOLLOW-UP VISIT: CPT

## 2023-09-06 RX ORDER — EZETIMIBE 10 MG/1
10 TABLET ORAL
Qty: 30 | Refills: 5 | Status: COMPLETED | COMMUNITY
Start: 2023-06-23 | End: 2023-09-06

## 2023-09-06 RX ORDER — VARENICLINE 1 MG/1
1 TABLET, FILM COATED ORAL
Qty: 51.5 | Refills: 0 | Status: COMPLETED | COMMUNITY
Start: 2022-01-19 | End: 2023-09-06

## 2023-09-06 NOTE — CONSULT LETTER
[FreeTextEntry2] : self referred [FreeTextEntry3] : Matt Mario MD, FACS\par  , Division of Thoracic Surgery\par  [facility]\par  Thoracic Surgery\par  [facility]\par  Department of Cardiovascular & Thoracic Surgery\par  \par  [facility] [employer] [facility]

## 2023-09-06 NOTE — REASON FOR VISIT
[de-identified] : Bronchoscopy; right video thoracoscopy; roboticassisted  right upper lobectomy with lymph node dissection, hilar and mediastinal. [de-identified] : 07/17/2023

## 2023-09-06 NOTE — PHYSICAL EXAM
[] : no respiratory distress [Auscultation Breath Sounds / Voice Sounds] : lungs were clear to auscultation bilaterally [Heart Rate And Rhythm] : heart rate was normal and rhythm regular [Heart Sounds] : normal S1 and S2 [Heart Sounds Gallop] : no gallops [Murmurs] : no murmurs [Heart Sounds Pericardial Friction Rub] : no pericardial rub [Clean] : clean [Dry] : dry [Healing Well] : healing well [FreeTextEntry2] : all incisions healed

## 2023-09-06 NOTE — DISCUSSION/SUMMARY
[Doing Well] : is doing well [Excellent Pain Control] : has excellent pain control [No Sign of Infection] : is showing no signs of infection [de-identified] : pt complaint of increase sensation to the right anterior chest wall

## 2023-09-11 ENCOUNTER — EMERGENCY (EMERGENCY)
Facility: HOSPITAL | Age: 78
LOS: 1 days | Discharge: HOME | End: 2023-09-11
Attending: EMERGENCY MEDICINE
Payer: COMMERCIAL

## 2023-09-11 VITALS
TEMPERATURE: 98 F | SYSTOLIC BLOOD PRESSURE: 147 MMHG | HEART RATE: 76 BPM | OXYGEN SATURATION: 97 % | WEIGHT: 209 LBS | RESPIRATION RATE: 18 BRPM | DIASTOLIC BLOOD PRESSURE: 82 MMHG | HEIGHT: 67 IN

## 2023-09-11 DIAGNOSIS — Z90.722 ACQUIRED ABSENCE OF OVARIES, BILATERAL: Chronic | ICD-10-CM

## 2023-09-11 DIAGNOSIS — Z87.891 PERSONAL HISTORY OF NICOTINE DEPENDENCE: Chronic | ICD-10-CM

## 2023-09-11 DIAGNOSIS — E66.9 OBESITY, UNSPECIFIED: Chronic | ICD-10-CM

## 2023-09-11 DIAGNOSIS — Z90.49 ACQUIRED ABSENCE OF OTHER SPECIFIED PARTS OF DIGESTIVE TRACT: Chronic | ICD-10-CM

## 2023-09-11 LAB
ALBUMIN SERPL ELPH-MCNC: 4.1 G/DL — SIGNIFICANT CHANGE UP (ref 3.3–5.2)
ALP SERPL-CCNC: 96 U/L — SIGNIFICANT CHANGE UP (ref 40–120)
ALT FLD-CCNC: 41 U/L — HIGH
ANION GAP SERPL CALC-SCNC: 12 MMOL/L — SIGNIFICANT CHANGE UP (ref 5–17)
AST SERPL-CCNC: 22 U/L — SIGNIFICANT CHANGE UP
BASOPHILS # BLD AUTO: 0.05 K/UL — SIGNIFICANT CHANGE UP (ref 0–0.2)
BASOPHILS NFR BLD AUTO: 0.5 % — SIGNIFICANT CHANGE UP (ref 0–2)
BILIRUB SERPL-MCNC: 0.4 MG/DL — SIGNIFICANT CHANGE UP (ref 0.4–2)
BUN SERPL-MCNC: 11.6 MG/DL — SIGNIFICANT CHANGE UP (ref 8–20)
CALCIUM SERPL-MCNC: 9.4 MG/DL — SIGNIFICANT CHANGE UP (ref 8.4–10.5)
CHLORIDE SERPL-SCNC: 98 MMOL/L — SIGNIFICANT CHANGE UP (ref 96–108)
CO2 SERPL-SCNC: 27 MMOL/L — SIGNIFICANT CHANGE UP (ref 22–29)
CREAT SERPL-MCNC: 0.56 MG/DL — SIGNIFICANT CHANGE UP (ref 0.5–1.3)
EGFR: 95 ML/MIN/1.73M2 — SIGNIFICANT CHANGE UP
EOSINOPHIL # BLD AUTO: 0.18 K/UL — SIGNIFICANT CHANGE UP (ref 0–0.5)
EOSINOPHIL NFR BLD AUTO: 2 % — SIGNIFICANT CHANGE UP (ref 0–6)
GLUCOSE SERPL-MCNC: 94 MG/DL — SIGNIFICANT CHANGE UP (ref 70–99)
HCT VFR BLD CALC: 41.2 % — SIGNIFICANT CHANGE UP (ref 34.5–45)
HGB BLD-MCNC: 14.3 G/DL — SIGNIFICANT CHANGE UP (ref 11.5–15.5)
IMM GRANULOCYTES NFR BLD AUTO: 0.3 % — SIGNIFICANT CHANGE UP (ref 0–0.9)
LIDOCAIN IGE QN: 28 U/L — SIGNIFICANT CHANGE UP (ref 22–51)
LYMPHOCYTES # BLD AUTO: 1.56 K/UL — SIGNIFICANT CHANGE UP (ref 1–3.3)
LYMPHOCYTES # BLD AUTO: 17 % — SIGNIFICANT CHANGE UP (ref 13–44)
MCHC RBC-ENTMCNC: 32.4 PG — SIGNIFICANT CHANGE UP (ref 27–34)
MCHC RBC-ENTMCNC: 34.7 GM/DL — SIGNIFICANT CHANGE UP (ref 32–36)
MCV RBC AUTO: 93.2 FL — SIGNIFICANT CHANGE UP (ref 80–100)
MONOCYTES # BLD AUTO: 0.72 K/UL — SIGNIFICANT CHANGE UP (ref 0–0.9)
MONOCYTES NFR BLD AUTO: 7.8 % — SIGNIFICANT CHANGE UP (ref 2–14)
NEUTROPHILS # BLD AUTO: 6.64 K/UL — SIGNIFICANT CHANGE UP (ref 1.8–7.4)
NEUTROPHILS NFR BLD AUTO: 72.4 % — SIGNIFICANT CHANGE UP (ref 43–77)
PLATELET # BLD AUTO: 280 K/UL — SIGNIFICANT CHANGE UP (ref 150–400)
POTASSIUM SERPL-MCNC: 4.2 MMOL/L — SIGNIFICANT CHANGE UP (ref 3.5–5.3)
POTASSIUM SERPL-SCNC: 4.2 MMOL/L — SIGNIFICANT CHANGE UP (ref 3.5–5.3)
PROT SERPL-MCNC: 6.7 G/DL — SIGNIFICANT CHANGE UP (ref 6.6–8.7)
RBC # BLD: 4.42 M/UL — SIGNIFICANT CHANGE UP (ref 3.8–5.2)
RBC # FLD: 12.1 % — SIGNIFICANT CHANGE UP (ref 10.3–14.5)
SODIUM SERPL-SCNC: 137 MMOL/L — SIGNIFICANT CHANGE UP (ref 135–145)
WBC # BLD: 9.18 K/UL — SIGNIFICANT CHANGE UP (ref 3.8–10.5)
WBC # FLD AUTO: 9.18 K/UL — SIGNIFICANT CHANGE UP (ref 3.8–10.5)

## 2023-09-11 PROCEDURE — 80053 COMPREHEN METABOLIC PANEL: CPT

## 2023-09-11 PROCEDURE — 99284 EMERGENCY DEPT VISIT MOD MDM: CPT | Mod: 25

## 2023-09-11 PROCEDURE — 85025 COMPLETE CBC W/AUTO DIFF WBC: CPT

## 2023-09-11 PROCEDURE — 99284 EMERGENCY DEPT VISIT MOD MDM: CPT

## 2023-09-11 PROCEDURE — 83690 ASSAY OF LIPASE: CPT

## 2023-09-11 PROCEDURE — 36415 COLL VENOUS BLD VENIPUNCTURE: CPT

## 2023-09-11 PROCEDURE — 96374 THER/PROPH/DIAG INJ IV PUSH: CPT

## 2023-09-11 PROCEDURE — 96375 TX/PRO/DX INJ NEW DRUG ADDON: CPT

## 2023-09-11 RX ORDER — FAMOTIDINE 10 MG/ML
20 INJECTION INTRAVENOUS ONCE
Refills: 0 | Status: COMPLETED | OUTPATIENT
Start: 2023-09-11 | End: 2023-09-11

## 2023-09-11 RX ORDER — LIDOCAINE 4 G/100G
10 CREAM TOPICAL ONCE
Refills: 0 | Status: COMPLETED | OUTPATIENT
Start: 2023-09-11 | End: 2023-09-11

## 2023-09-11 RX ORDER — ACETAMINOPHEN 500 MG
1000 TABLET ORAL ONCE
Refills: 0 | Status: COMPLETED | OUTPATIENT
Start: 2023-09-11 | End: 2023-09-11

## 2023-09-11 RX ADMIN — Medication 30 MILLILITER(S): at 21:29

## 2023-09-11 RX ADMIN — Medication 400 MILLIGRAM(S): at 21:28

## 2023-09-11 RX ADMIN — FAMOTIDINE 20 MILLIGRAM(S): 10 INJECTION INTRAVENOUS at 21:29

## 2023-09-11 RX ADMIN — LIDOCAINE 10 MILLILITER(S): 4 CREAM TOPICAL at 21:29

## 2023-09-11 NOTE — ED ADULT NURSE NOTE - OBJECTIVE STATEMENT
assumed care of pt at 2130. pt c/o burning to stomach since Friday. reports vomiting Friday Saturday after having "funky food". denies abd pain, fevers or diarrhea. rr even and unlabored. anox4. pt educated on plan of care, pt able to successfully teach back plan of care to RN, RN will continue to reeducate pt during hospital stay.

## 2023-09-11 NOTE — ED PROVIDER NOTE - PHYSICAL EXAMINATION
Gen: No acute distress, non toxic  HEENT: Mucous membranes moist, pink conjunctivae, EOMI  CV: RRR, nl s1/s2.  Resp: CTAB, normal rate and effort  GI: Abdomen soft, NT, negative Lozano's, No rebound, no guarding, slightly distended   : No CVAT  Neuro: A&O x 3, moving all 4 extremities  MSK: No spine or joint tenderness to palpation  Skin: No rashes. intact and perfused.

## 2023-09-11 NOTE — ED PROVIDER NOTE - OBJECTIVE STATEMENT
77 y/o female with PMHx of lung CA s/p lobectomy c/o "acidity" in mouth with some abdominal discomfort and burning, started after eating chinese on Thursday, 12 hours vomiting, 24 hours diarrhea which have both resolved abdominal discomfort has lingered, no BM since diarrhea on Sat morning, no fevers no chills, no urinary symptoms, no other complaints

## 2023-09-11 NOTE — ED PROVIDER NOTE - NSFOLLOWUPINSTRUCTIONS_ED_ALL_ED_FT
- Over the counter Pepcid OR Omeprazole daily.   - Increase fluids.  - [place] diet as tolerated. Avoid citrus based food/drink, spicy/greasy foods, milk, caffeine.   - Please call to schedule follow up appointment with your primary care physician within 24-48 hours.  - Please seek immediate medical attention for any new/worsening, signs/symptoms, or concerns.    Feel better!      Acute Abdominal Pain    WHAT YOU NEED TO KNOW:    What do I need to know about acute abdominal pain? Acute abdominal pain usually starts suddenly and gets worse quickly.     What are minor causes of acute abdominal pain?   •An allergic reaction to food, or food poisoning      •Stress      •Acid reflux      •Constipation      •Monthly period pain in females      What are serious causes of acute abdominal pain?   •Inflammation or rupture of your appendix      •Swelling or an infection in your abdomen or organ      •A blockage in your bowels      •An ulcer or a tear in your esophagus, stomach, or intestines      •Bleeding in your abdomen or an organ      •Stones in your kidney or gallbladder      •Diseases of the fallopian tubes or ovaries      •An ectopic pregnancy      How is the cause of acute abdominal pain diagnosed? Your healthcare provider will ask about your signs and symptoms. Tell the provider when your symptoms started and about any recent travel or surgery. Also tell him what makes the pain better or worse, and what treatments you have tried. The provider will examine you. Based on what your provider finds from the exam, and your symptoms, you may need other tests. Examples include blood or urine tests, an ultrasound, a CT scan, or an endoscopy.     How is acute abdominal pain treated? Treatment may depend on the cause of your abdominal pain. You may need any of the following:   •Medicines may be given to decrease pain, treat an infection, and manage your symptoms, such as constipation.       •Surgery may be needed to treat a serious cause of abdominal pain. Examples include surgery to treat appendicitis or a blockage in your bowels.       How can I manage my symptoms?   •Apply heat on your abdomen for 20 to 30 minutes every 2 hours for as many days as directed. Heat helps decrease pain and muscle spasms.       •Make changes to the food you eat as directed. Do not eat foods that cause abdominal pain or other symptoms. Eat small meals more often. ?Eat more high-fiber foods if you are constipated. High-fiber foods include fruits, vegetables, whole-grain foods, and legumes.       ?Do not eat foods that cause gas if you have bloating. Examples include broccoli, cabbage, and cauliflower. Do not drink soda or carbonated drinks, because these may also cause gas.       ?Do not eat foods or drinks that contain sorbitol or fructose if you have diarrhea and bloating. Some examples are fruit juices, candy, jelly, and sugar-free gum.       ?Do not eat high-fat foods, such as fried foods, cheeseburgers, hot dogs, and desserts.      ?Limit or do not drink caffeine. Caffeine may make symptoms, such as heart burn or nausea, worse.       ?Drink plenty of liquids to prevent dehydration from diarrhea or vomiting. Ask your healthcare provider how much liquid to drink each day and which liquids are best for you.       •Manage your stress. Stress may cause abdominal pain. Your healthcare provider may recommend relaxation techniques and deep breathing exercises to help decrease your stress. Your healthcare provider may recommend you talk to someone about your stress or anxiety, such as a counselor or a trusted friend. Get plenty of sleep and exercise regularly.       •Limit or do not drink alcohol. Alcohol can make your abdominal pain worse. Ask your healthcare provider if it is safe for you to drink alcohol. Also ask how much is safe for you to drink.       •Do not smoke. Nicotine and other chemicals in cigarettes can damage your esophagus and stomach. Ask your healthcare provider for information if you currently smoke and need help to quit. E-cigarettes or smokeless tobacco still contain nicotine. Talk to your healthcare provider before you use these products.       When should I seek immediate care?   •You vomit blood or cannot stop vomiting.      •You have blood in your bowel movement or it looks like tar.       •You have bleeding from your rectum.       •Your abdomen is larger than usual, more painful, and hard.       •You have severe pain in your abdomen.       •You stop passing gas and having bowel movements.       •You feel weak, dizzy, or faint.      When should I contact my healthcare provider?   •You have a fever.      •You have new signs and symptoms.      •Your symptoms do not get better with treatment.       •You have questions or concerns about your condition or care.      CARE AGREEMENT:    You have the right to help plan your care. Learn about your health condition and how it may be treated. Discuss treatment options with your healthcare providers to decide what care you want to receive. You always have the right to refuse treatment.

## 2023-09-11 NOTE — ED PROVIDER NOTE - ATTENDING APP SHARED VISIT CONTRIBUTION OF CARE
Kelli: I performed a face to face bedside interview with patient regarding history of present illness, review of symptoms and past medical history. I completed an independent physical exam and ordered tests/medications as needed.  I have discussed patient's plan of care with advanced care provider. The advanced care provider assisted in  executing the discussed plan. I was available for any questions or issues that may have arose during the execution of the plan of care.

## 2023-09-11 NOTE — ED PROVIDER NOTE - PATIENT PORTAL LINK FT
You can access the FollowMyHealth Patient Portal offered by [facility] by registering at the following website: [email or web address]. By joining [employer]’s FollowMyHealth portal, you will also be able to view your health information using other applications (apps) compatible with our system.

## 2023-09-11 NOTE — ED PROVIDER NOTE - CLINICAL SUMMARY MEDICAL DECISION MAKING FREE TEXT BOX
79 y/o female with PMHx of lung CA s/p lobectomy c/o abdominal burning an metallic taste in mouth, resolved vomiting and diarrhea, abdomen is benign, will check labs, treat symptoms offered/recommended CT, however pt states she does not want CT or UA, just wants labs and symptom control and to f/u with PMD 77 y/o female with PMHx of lung CA s/p lobectomy c/o abdominal burning an metallic taste in mouth, resolved vomiting and diarrhea, abdomen is benign, will check labs, treat symptoms offered/recommended CT, however pt states she does not want CT or UA, just wants labs and symptom control and to f/u with PMD    MARITO Mendoza 2226: Results reviewed with patient. Feels well. Reiterated does not want CT. Medically stable for discharge.

## 2023-09-11 NOTE — ED PROVIDER NOTE - CARE PROVIDER_API CALL
Isai Colon  Gastroenterology  [address] [facility] [address]  [place] [address]  Phone: [phone]  Fax: [phone]  Follow Up Time:

## 2023-09-11 NOTE — ED ADULT NURSE NOTE - NSFALLUNIVINTERV_ED_ALL_ED
Bed/Stretcher in lowest position, wheels locked, appropriate side rails in place/Call bell, personal items and telephone in reach/Instruct patient to call for assistance before getting out of bed/chair/stretcher/Non-slip footwear applied when patient is off stretcher/[place] to call system/Physically safe environment - no spills, clutter or unnecessary equipment/Purposeful proactive rounding/Room/bathroom lighting operational, light cord in reach

## 2023-09-11 NOTE — ED ADULT TRIAGE NOTE - ARRIVAL FROM
"  History   Chief Complaint:  Chest Pain     HPI   Maddie Orourke is a 34 year old female with history of alcohol abuse and failure to thrive in adult who presents with chest pain amongst other complaints. Patient states she developed chest pain which she describes as \"jumping out of my chest\" sensations this morning at 0630 and it worsened this afternoon which prompted her to call EMS. She complains of nausea and nonbloody vomiting. Her last drink of alcohol was at 2230 last night and she drank six shots of whisky. She denies suicidal ideation. She denies illicit drug use. She denies significant abdominal pain.  She reports accompanying increasing anxiety and paresthesias in her hands that prompted her to call 911.  No reported headache/focal weakness.  She is a daily drinker and drinks approximately 6 shots a day. She was recently admitted to the hospital for electrolyte derangements.  She denies a history of cancer or blood clots. She does not take any hormonal medication. She defers any admission to detox at this time. She has received her first dose of the COVID-19 vaccine.     Review of Systems   Constitutional: Negative for fatigue and fever.   Respiratory: Negative for cough and shortness of breath.    Cardiovascular: Positive for chest pain.   Gastrointestinal: Positive for nausea and vomiting. Negative for abdominal pain.   Neurological: Positive for numbness. Negative for weakness and headaches.   Psychiatric/Behavioral: Negative for suicidal ideas. The patient is nervous/anxious.    All other systems reviewed and are negative.      Allergies:  No known drug allergies    Medications:  Folic Acid   Magnesium   Klor-Con   Thiamine     Past Medical History:    Alcohol Abuse   ALEX   Failure to Thrive in Adult   Alcohol Withdrawal     Past Surgical History:    Repair of Deviated Septum      Social History:  Arrives to the emergency department unaccompanied.     Physical Exam     Patient Vitals for the past " 24 hrs:   BP Temp Temp src Pulse Resp SpO2   06/16/21 1800 (!) 147/107 -- -- 92 18 --   06/16/21 1730 (!) 150/110 -- -- 105 18 99 %   06/16/21 1700 (!) 141/99 -- -- 102 22 --   06/16/21 1630 (!) 141/103 -- -- 98 28 --   06/16/21 1600 (!) 124/114 -- -- 91 19 --   06/16/21 1530 (!) 143/100 -- -- 101 12 98 %   06/16/21 1500 -- 98.2  F (36.8  C) Temporal 123 (!) 32 --   06/16/21 1445 -- -- -- 96 19 --   06/16/21 1430 -- -- -- 112 26 --   06/16/21 1415 -- -- -- 108 13 --   06/16/21 1400 -- -- -- 105 14 99 %   06/16/21 1345 -- -- -- 101 23 98 %   06/16/21 1343 -- -- -- 111 25 98 %   06/16/21 1342 -- -- -- 103 16 97 %   06/16/21 1341 -- -- -- 92 20 99 %   06/16/21 1340 -- -- -- 92 21 99 %   06/16/21 1338 -- -- -- -- -- 99 %   06/16/21 1337 (!) 143/105 -- -- 104 -- 99 %   06/16/21 1336 -- -- -- -- -- 99 %   06/16/21 1335 -- -- -- -- -- 100 %       Physical Exam  Nursing note and vitals reviewed.  Constitutional: Well nourished.   Eyes: Conjunctiva normal.  Pupils are equal, round, and reactive to light.   ENT: Nose normal. Mucous membranes pink and moist.    Neck: Normal range of motion.  CVS: Sinus tachycardia.  Normal heart sounds.    Pulmonary: Lungs clear to auscultation bilaterally. No wheezes/rales/rhonchi.  GI: Abdomen soft. Nontender, nondistended. No rigidity or guarding.    MSK: No calf tenderness or swelling.  Neuro: Alert. Follows simple commands.  Mildly tremulous. Moves all extremities equally and symmetrically  Skin: Skin is warm and dry. No rash noted.   Psychiatric: Anxious appearing      Emergency Department Course   ECG  ECG taken at 1335, ECG read at 1335  Normal sinus rhythm   Cannot rule out Anterior infarct, age undetermined   Abnormal ECG   Rate 94 bpm. OH interval 138 ms. QRS duration 94 ms. QT/QTc 390/487 ms. P-R-T axes 25 -7 29.     Imaging:  XR Chest Port 1 View  No acute findings. The lungs are clear and there are no  pleural effusions. Normal heart size.  Reading per  radiology    Laboratory:  CBC: WBC 10.2, HGB 13.4,    CMP: Glucose 103 (H), Anion Gap 16 (H), Alk Phos 186 (H), AST 53 (H), o/w WNL (Creatinine: 0.53)    Troponin (Collected 1454): <0.015  Lipase: 34 (L)    Alcohol Ethyl: <0.01  Magnesium: 2.0    ISTAT Qualitative Pregnancy POCT: <5.0  Symptomatic Influenza SARS-COVIS-19 Virus PCR: Negative    Emergency Department Course:    Reviewed:  I reviewed nursing notes, vitals, past medical history and care everywhere    Assessments:  1414 I obtained history and examined the patient as noted above.   1706 I rechecked the patient.   1734 I rechecked the patient and explained findings.     Interventions:  1521 Ativan 2 mg IV   1524 (0.9% NaCl with MV 10mL, thiamine 100mg, folic acid 1 mg, magnesium sulfate 2 g) 1L IV infusion    Disposition:  The patient was discharged to home.     Impression & Plan   HEART Score:    Predicts Major Adverse Cardiac Events within 6 weeks:    History:   Highly suspicious:  2   Moderately suspicious: 1   Slightly/Non-suspicious: 0  ECG:   Significant ST depression 2   Nonspecific repolarization 1   Normal    0  Age:    >=65    2   >45-<65   1   <= 45    0  Risk Factors [DM, Current or recent smoker, HTN, HLP, FMH CAD, Obesity]   >=3 or Hx. CAD  2   1-2    1   None    0  Troponin:   >= 3x normal   2   >1 to <3x normal  1   Normal    0    This Patient's Score:   0    Interpretation:    0-3:    2.5% risk of MACE (may consider D/C)   4-6:    20.3% (Observation)   7-10:    72.7% (Admit, consider early invasive strategy)        Medical Decision Making:  Maddie Orourke is a 34 year old female presenting with myriad of complaints, predominately, chest pain, paraesthesias, nausea, and vomiting. She is mildly tachycardic and hypertensive on arrival. She is slightly tremulous as well. She was given IV ativan with significant symptom improvement. Her EKG is without focal ischemia or underlying arrhythmia. Delta-troponin negative, clinically low  suspicion for ACS. Chest x-ray without focal pneumonia, fluid overload, widened mediastinum, or pneumothorax. I have a clinically low suspicion for PE at this time; no reported dyspnea/hypoxia. She tested negative for COVID-19, sensitively of testing discussed. Labs reviewed, no evidence to suggest underlying infection, or significant electrolyte derangement. She reported significant symptom improvement after IV Ativan. She reported her paresthesias resolved completely; stronger suspicion more related to underlying anxiety.  I doubt serious neurologic process and do not feel advanced neuroimaging is warranted.  She was offered detox resources, though is declining at this point in time. She does appear stable for outpatient follow up. I will initiate Librium on dispo as well as ODT Zofran given concern for withdrawal symptoms. She is agreeable to close outpatient follow up and will return if symptoms worsen or change.     Covid-19  Madide Orourke was evaluated during a global COVID-19 pandemic, which necessitated consideration that the patient might be at risk for infection with the SARS-CoV-2 virus that causes COVID-19.   Applicable protocols for evaluation were followed during the patient's care. COVID-19 was considered as part of the patient's evaluation. The plan for testing is: a test was obtained during this visit which returned negative.     Diagnosis:    ICD-10-CM    1. History of alcohol abuse  F10.11    2. Chest pain, unspecified type  R07.9        Discharge Medications:  Discharge Medication List as of 6/16/2021  5:58 PM      START taking these medications    Details   chlordiazePOXIDE (LIBRIUM) 25 MG capsule Take 1 capsule (25 mg) by mouth 3 times daily as needed for anxiety or withdrawal, Disp-25 capsule, R-0, Local Print      ondansetron (ZOFRAN ODT) 4 MG ODT tab Take 1 tablet (4 mg) by mouth every 8 hours as needed for nausea, Disp-10 tablet, R-0, Local Print             Scribe Disclosure:  Derrell STRICKLAND  Hector, am serving as a scribe at 2:13 PM on 6/16/2021 to document services personally performed by Uyen Santos DO based on my observations and the provider's statements to me.        Uyen Santos DO  06/16/21 1852     Home

## 2023-09-11 NOTE — ED ADULT TRIAGE NOTE - CHIEF COMPLAINT QUOTE
pt c/o vomiting since Friday after eating Chinese on Thursday, c/o burning to stomach  A&Ox3, resp wnl, s/p lung surgery in january

## 2023-10-04 ENCOUNTER — NON-APPOINTMENT (OUTPATIENT)
Age: 78
End: 2023-10-04

## 2023-10-04 ENCOUNTER — APPOINTMENT (OUTPATIENT)
Dept: THORACIC SURGERY | Facility: CLINIC | Age: 78
End: 2023-10-04
Payer: COMMERCIAL

## 2023-10-04 VITALS
SYSTOLIC BLOOD PRESSURE: 130 MMHG | OXYGEN SATURATION: 90 % | BODY MASS INDEX: 32.8 KG/M2 | HEART RATE: 79 BPM | RESPIRATION RATE: 17 BRPM | DIASTOLIC BLOOD PRESSURE: 71 MMHG | HEIGHT: 67 IN | WEIGHT: 209 LBS

## 2023-10-04 PROCEDURE — 99213 OFFICE O/P EST LOW 20 MIN: CPT | Mod: 24

## 2023-12-14 ENCOUNTER — APPOINTMENT (OUTPATIENT)
Dept: SURGERY | Facility: CLINIC | Age: 78
End: 2023-12-14
Payer: COMMERCIAL

## 2023-12-14 VITALS
HEART RATE: 81 BPM | OXYGEN SATURATION: 96 % | WEIGHT: 200 LBS | SYSTOLIC BLOOD PRESSURE: 135 MMHG | RESPIRATION RATE: 20 BRPM | BODY MASS INDEX: 31.39 KG/M2 | HEIGHT: 67 IN | DIASTOLIC BLOOD PRESSURE: 75 MMHG | TEMPERATURE: 98.4 F

## 2023-12-14 DIAGNOSIS — Z80.3 FAMILY HISTORY OF MALIGNANT NEOPLASM OF BREAST: ICD-10-CM

## 2023-12-14 PROCEDURE — 99213 OFFICE O/P EST LOW 20 MIN: CPT

## 2024-01-08 ENCOUNTER — NON-APPOINTMENT (OUTPATIENT)
Age: 79
End: 2024-01-08

## 2024-01-24 ENCOUNTER — APPOINTMENT (OUTPATIENT)
Dept: THORACIC SURGERY | Facility: CLINIC | Age: 79
End: 2024-01-24

## 2024-01-24 ENCOUNTER — NON-APPOINTMENT (OUTPATIENT)
Age: 79
End: 2024-01-24

## 2024-03-27 ENCOUNTER — NON-APPOINTMENT (OUTPATIENT)
Age: 79
End: 2024-03-27

## 2024-03-27 ENCOUNTER — APPOINTMENT (OUTPATIENT)
Dept: THORACIC SURGERY | Facility: CLINIC | Age: 79
End: 2024-03-27
Payer: COMMERCIAL

## 2024-03-27 VITALS
SYSTOLIC BLOOD PRESSURE: 104 MMHG | HEART RATE: 91 BPM | OXYGEN SATURATION: 91 % | HEIGHT: 67 IN | BODY MASS INDEX: 32.96 KG/M2 | DIASTOLIC BLOOD PRESSURE: 72 MMHG | RESPIRATION RATE: 17 BRPM | WEIGHT: 210 LBS

## 2024-03-27 DIAGNOSIS — R91.1 SOLITARY PULMONARY NODULE: ICD-10-CM

## 2024-03-27 PROCEDURE — 99213 OFFICE O/P EST LOW 20 MIN: CPT

## 2024-03-27 NOTE — ASSESSMENT
[FreeTextEntry1] : Ms. MONI BENAVIDEZ, 79 year old female, former smoker (1 PPD x 50 years, quit 7/17/2023); started cessation treatment June, 2023, now down to 2 cigs daily. On Varenicline. Pmhx of HTN, diverticular disease, osteoporosis. CT revealing multiple lung nodule.  Now s/p Bronchoscopy; right video thoracoscopy; robotic assisted right upper lobectomy with lymph node dissection, hilar and mediastinal ON 07/17/2023. Path of RUL reveals adenocarcinoma, solid predominant, with acinar component, spanning 1.8 cm in greatest dimension. Lymphatic invasion is identified. The bronchial margin is negative for tumor and the nearest margin is 3.1 cm. [id number] (Stage IA2). Path of LNs level 4, 7, 8, 10, 11 Negative for carcinoma.  She presents today for follow up.  I have independently reviewed the medical records and imaging at the time of this office consultation. CT imaging reviewed and compared to prior. Stable findings. Recommend repreating non contrast CT Chest in 6 months to re-evaluate stability. She is agreeable.   Recommendations reviewed with patient during this office visit, and all questions answered; Patient instructed on the importance of follow up and verbalizes understanding.   I, KERI Palacio, personally performed the evaluation and management (E/M) services for this established patient. That E/M includes conducting the examination, assessing all new/exacerbated conditions, and establishing a new plan of care. Today, My ACP, Nelli Holder, was here to observe my evaluation and management services for this patient to be followed going forward.

## 2024-03-27 NOTE — PHYSICAL EXAM
[] : no respiratory distress [Respiration, Rhythm And Depth] : normal respiratory rhythm and effort [Exaggerated Use Of Accessory Muscles For Inspiration] : no accessory muscle use [Auscultation Breath Sounds / Voice Sounds] : lungs were clear to auscultation bilaterally [Heart Rate And Rhythm] : heart rate was normal and rhythm regular [Examination Of The Chest] : the chest was normal in appearance [Diminished Respiratory Excursion] : normal chest expansion [Chest Visual Inspection Thoracic Asymmetry] : no chest asymmetry [2+] : right 2+ [Cervical Lymph Nodes Enlarged Posterior Bilaterally] : posterior cervical [Cervical Lymph Nodes Enlarged Anterior Bilaterally] : anterior cervical [Supraclavicular Lymph Nodes Enlarged Bilaterally] : supraclavicular [Skin Color & Pigmentation] : normal skin color and pigmentation [No Focal Deficits] : no focal deficits [Oriented To Time, Place, And Person] : oriented to person, place, and time

## 2024-03-27 NOTE — HISTORY OF PRESENT ILLNESS
[FreeTextEntry1] : Ms. MONI BENAVIDEZ, 79 year old female, former smoker (1 PPD x 50 years, quit 7/17/2023); started cessation treatment June, 2023, now down to 2 cigs daily. On Varenicline. Pmhx of HTN, diverticular disease, osteoporosis. Recent CT revealing multiple lung nodule.  PET/CT on 06/05/2023 ([facility]): - There is an intensely FDG avid right upper lobe mass (SUV 9.3, 1.6 x 1.9 cm, series 3 image 79). This is suspicious for malignancy. - There are atelectatic changes in the anterior right upper lobe and the lingula without corresponding FDG activity. - There is an FDG avid right axillary lymph node (SUV 1.9, 1.5 cm, series 3 image 74), likely reactive in nature.  PFTs on 06/08/2023: FVC 2.07, 68%; FEV1 1.49, 64%; DLCO 17.64, 79%  Now s/p Bronchoscopy; right video thoracoscopy; robotic assisted right upper lobectomy with lymph node dissection, hilar and mediastinal ON 07/17/2023. Path of RUL reveals adenocarcinoma, solid predominant, with acinar component, spanning 1.8 cm in greatest dimension. Lymphatic invasion is identified. The bronchial margin is negative for tumor and the nearest margin is 3.1 cm. [id number] (Stage IA2). Path of LNs level 4, 7, 8, 10, 11 Negative for carcinoma.  Post op course complicated by persistent air leak. Chest tube subsequently removed on POD 5  Seen on 09/06/2023: Recommend pt to return to office in 6 weeks with CT w/o contrast. pt complaint about increased sensation, discussed gabapentin but patient declined. recommended warm compress to area.  CT Chest on 09/28/2023 ([facility]): - There has been interval performance of right upper lobectomy. There is bronchial thickening, without suspicious nodule at this time.  01/08/2024: Contacted office with c/o right arm pain with movement and having trouble with lifting things to her full potential. Discussed arm pain is not related to her surgery in 07/2023. Sounds to be muscular pain, advised patient to follow up with PCP for further evaluation of her arm.  CT Chest on 03/21/2024 ([facility]): - Patient is again noted to be status post right upper lobectomy with volume loss and scarring.  - New patchy parenchymal densities however in the lateral right costophrenic sulcus attributable to inflammatory nature.  - There again slight elements of linear atelectasis left lingula segment.  - There is a stable prominent right axillary lymph node measuring transversely 16 x 16 mm. - Grossly there is again dense patchy calcification the aorta and lesser calcification the coronary vasculature. The pulmonary arterial vasculature is again prominent suggesting pulmonary hypertension.  She presents today for follow up. Today, patient denies worsening SOB, chest pain, cough, hemoptysis, fever, chills, night sweats, lightheadedness or dizziness.

## 2024-06-13 ENCOUNTER — APPOINTMENT (OUTPATIENT)
Dept: SURGERY | Facility: CLINIC | Age: 79
End: 2024-06-13
Payer: COMMERCIAL

## 2024-06-13 VITALS
RESPIRATION RATE: 16 BRPM | TEMPERATURE: 98.2 F | HEIGHT: 67 IN | WEIGHT: 210 LBS | SYSTOLIC BLOOD PRESSURE: 148 MMHG | OXYGEN SATURATION: 93 % | HEART RATE: 93 BPM | DIASTOLIC BLOOD PRESSURE: 81 MMHG | BODY MASS INDEX: 32.96 KG/M2

## 2024-06-13 DIAGNOSIS — N60.19 DIFFUSE CYSTIC MASTOPATHY OF UNSPECIFIED BREAST: ICD-10-CM

## 2024-06-13 DIAGNOSIS — Z12.39 ENCOUNTER FOR OTHER SCREENING FOR MALIGNANT NEOPLASM OF BREAST: ICD-10-CM

## 2024-06-13 DIAGNOSIS — N63.0 UNSPECIFIED LUMP IN UNSPECIFIED BREAST: ICD-10-CM

## 2024-06-13 DIAGNOSIS — C34.90 MALIGNANT NEOPLASM OF UNSPECIFIED PART OF UNSPECIFIED BRONCHUS OR LUNG: ICD-10-CM

## 2024-06-13 PROCEDURE — 99213 OFFICE O/P EST LOW 20 MIN: CPT

## 2024-08-22 NOTE — HISTORY OF PRESENT ILLNESS
[FreeTextEntry1] : Ms. MONI BENAVIDEZ, 79 year old female, former smoker (1 PPD x 50 years, quit 7/17/2023); started cessation treatment June, 2023, now down to 2 cigs daily. On Varenicline. Pmhx of HTN, diverticular disease, osteoporosis. Recent CT revealing multiple lung nodule.  PET/CT on 06/05/2023 ([facility] - There is an intensely FDG avid right upper lobe mass (SUV 9.3, 1.6 x 1.9 cm, series 3 image 79). This is suspicious for malignancy. - There are atelectatic changes in the anterior right upper lobe and the lingula without corresponding FDG activity. - There is an FDG avid right axillary lymph node (SUV 1.9, 1.5 cm, series 3 image 74), likely reactive in nature.  PFTs on 06/08/2023: FVC 2.07, 68%; FEV1 1.49, 64%; DLCO 17.64, 79%  Now s/p Bronchoscopy; right video thoracoscopy; robotic assisted right upper lobectomy with lymph node dissection, hilar and mediastinal ON 07/17/2023. Path of RUL reveals adenocarcinoma, solid predominant, with acinar component, spanning 1.8 cm in greatest dimension. Lymphatic invasion is identified. The bronchial margin is negative for tumor and the nearest margin is 3.1 cm. [id number] (Stage IA2). Path of LNs level 4, 7, 8, 10, 11 Negative for carcinoma.  Post op course complicated by persistent air leak. Chest tube subsequently removed on POD 5  Seen on 09/06/2023: Recommend pt to return to office in 6 weeks with CT w/o contrast. pt complaint about increased sensation, discussed gabapentin but patient declined. recommended warm compress to area.  CT Chest on 09/28/2023 ([facility] - There has been interval performance of right upper lobectomy. There is bronchial thickening, without suspicious nodule at this time.  CT Chest on 03/21/2024 ([facility] - Patient is again noted to be status post right upper lobectomy with volume loss and scarring.  - New patchy parenchymal densities however in the lateral right costophrenic sulcus attributable to inflammatory nature.  - There again slight elements of linear atelectasis left lingula segment.  - There is a stable prominent right axillary lymph node measuring transversely 16 x 16 mm. - Grossly there is again dense patchy calcification the aorta and lesser calcification the coronary vasculature. The pulmonary arterial vasculature is again prominent suggesting pulmonary hypertension.  CT Chest on.... -  She presents today for 6 months follow up.

## 2024-08-22 NOTE — ASSESSMENT
[FreeTextEntry1] : Ms. MONI BENAVIDEZ, 79 year old female, former smoker (1 PPD x 50 years, quit 7/17/2023); started cessation treatment June, 2023, now down to 2 cigs daily. On Varenicline. Pmhx of HTN, diverticular disease, osteoporosis. CT revealing multiple lung nodule.  Now s/p Bronchoscopy; right video thoracoscopy; robotic assisted right upper lobectomy with lymph node dissection, hilar and mediastinal ON 07/17/2023. Path of RUL reveals adenocarcinoma, solid predominant, with acinar component, spanning 1.8 cm in greatest dimension. Lymphatic invasion is identified. The bronchial margin is negative for tumor and the nearest margin is 3.1 cm. [id number] (Stage IA2). Path of LNs level 4, 7, 8, 10, 11 Negative for carcinoma.  She presents today for follow up with imaging.  I have independently reviewed the medical records and imaging at the time of this office consultation.   Recommendations reviewed with patient during this office visit, and all questions answered; Patient instructed on the importance of follow up and verbalizes understanding.

## 2024-09-25 ENCOUNTER — APPOINTMENT (OUTPATIENT)
Dept: THORACIC SURGERY | Facility: CLINIC | Age: 79
End: 2024-09-25

## 2024-09-25 ENCOUNTER — NON-APPOINTMENT (OUTPATIENT)
Age: 79
End: 2024-09-25

## 2024-09-25 VITALS
SYSTOLIC BLOOD PRESSURE: 124 MMHG | HEART RATE: 84 BPM | BODY MASS INDEX: 34.53 KG/M2 | RESPIRATION RATE: 17 BRPM | DIASTOLIC BLOOD PRESSURE: 73 MMHG | WEIGHT: 220 LBS | HEIGHT: 67 IN | OXYGEN SATURATION: 92 %

## 2024-09-25 DIAGNOSIS — C34.90 MALIGNANT NEOPLASM OF UNSPECIFIED PART OF UNSPECIFIED BRONCHUS OR LUNG: ICD-10-CM

## 2024-09-25 PROCEDURE — 99213 OFFICE O/P EST LOW 20 MIN: CPT

## 2024-09-25 NOTE — HISTORY OF PRESENT ILLNESS
[FreeTextEntry1] : Ms. MONI BENAVIDEZ, 79 year old female, former smoker (1 PPD x 50 years, quit 7/17/2023); started cessation treatment June, 2023, now down to 2 cigs daily. On Varenicline. Pmhx of HTN, diverticular disease, osteoporosis. Recent CT revealing multiple lung nodule.  PET/CT on 06/05/2023 ([facility]): - There is an intensely FDG avid right upper lobe mass (SUV 9.3, 1.6 x 1.9 cm, series 3 image 79). This is suspicious for malignancy. - There are atelectatic changes in the anterior right upper lobe and the lingula without corresponding FDG activity. - There is an FDG avid right axillary lymph node (SUV 1.9, 1.5 cm, series 3 image 74), likely reactive in nature.  PFTs on 06/08/2023: FVC 2.07, 68%; FEV1 1.49, 64%; DLCO 17.64, 79%  Now s/p Bronchoscopy; right video thoracoscopy; robotic assisted right upper lobectomy with lymph node dissection, hilar and mediastinal on 07/17/2023. Path of RUL reveals adenocarcinoma, solid predominant, with acinar component, spanning 1.8 cm in greatest dimension. Lymphatic invasion is identified. The bronchial margin is negative for tumor and the nearest margin is 3.1 cm. [id number] (Stage IA2). Path of LNs level 4, 7, 8, 10, 11 Negative for carcinoma.  Post op course complicated by persistent air leak. Chest tube subsequently removed on POD 5  Seen on 09/06/2023: Recommend pt to return to office in 6 weeks with CT w/o contrast. pt complaint about increased sensation, discussed gabapentin but patient declined. recommended warm compress to area.  CT Chest on 09/28/2023 ([facility]): - There has been interval performance of right upper lobectomy. There is bronchial thickening, without suspicious nodule at this time.  CT Chest on 03/21/2024 ([facility]): - Patient is again noted to be status post right upper lobectomy with volume loss and scarring.  - New patchy parenchymal densities however in the lateral right costophrenic sulcus attributable to inflammatory nature.  - There again slight elements of linear atelectasis left lingula segment.  - There is a stable prominent right axillary lymph node measuring transversely 16 x 16 mm. - Grossly there is again dense patchy calcification the aorta and lesser calcification the coronary vasculature. The pulmonary arterial vasculature is again prominent suggesting pulmonary hypertension.  CT Chest on 09/17/2024 ([facility]): - Stable postsurgical changes in right lung.  - There are persistent subpleural patchy densities in lateral aspect of right lung base noted previously, perhaps lingering atelectasis. - New subpleural small patchy groundglass density is seen along posterior medial SANDY against the spine (2:27), may represent transient atelectasis.  - Another small subpleural groundglass density is seen in posterior lateral left lung base (2:82), probably similar process. - Stable prominent right axillary node, now measuring about 13 x 14 mm.   She presents today for 6 months follow up. Pt reports well, did have a cold about 2-3

## 2024-09-25 NOTE — HISTORY OF PRESENT ILLNESS
[FreeTextEntry1] : Ms. MONI BENAVIDEZ, 79 year old female, former smoker (1 PPD x 50 years, quit 7/17/2023); started cessation treatment June, 2023, now down to 2 cigs daily. On Varenicline. Pmhx of HTN, diverticular disease, osteoporosis. Recent CT revealing multiple lung nodule.  PET/CT on 06/05/2023 ([facility]): - There is an intensely FDG avid right upper lobe mass (SUV 9.3, 1.6 x 1.9 cm, series 3 image 79). This is suspicious for malignancy. - There are atelectatic changes in the anterior right upper lobe and the lingula without corresponding FDG activity. - There is an FDG avid right axillary lymph node (SUV 1.9, 1.5 cm, series 3 image 74), likely reactive in nature.  PFTs on 06/08/2023: FVC 2.07, 68%; FEV1 1.49, 64%; DLCO 17.64, 79%  Now s/p Bronchoscopy; right video thoracoscopy; robotic assisted right upper lobectomy with lymph node dissection, hilar and mediastinal on 07/17/2023. Path of RUL reveals adenocarcinoma, solid predominant, with acinar component, spanning 1.8 cm in greatest dimension. Lymphatic invasion is identified. The bronchial margin is negative for tumor and the nearest margin is 3.1 cm. [id number] (Stage IA2). Path of LNs level 4, 7, 8, 10, 11 Negative for carcinoma.  Post op course complicated by persistent air leak. Chest tube subsequently removed on POD 5  Seen on 09/06/2023: Recommend pt to return to office in 6 weeks with CT w/o contrast. pt complaint about increased sensation, discussed gabapentin but patient declined. recommended warm compress to area.  CT Chest on 09/28/2023 ([facility]): - There has been interval performance of right upper lobectomy. There is bronchial thickening, without suspicious nodule at this time.  CT Chest on 03/21/2024 ([facility]): - Patient is again noted to be status post right upper lobectomy with volume loss and scarring.  - New patchy parenchymal densities however in the lateral right costophrenic sulcus attributable to inflammatory nature.  - There again slight elements of linear atelectasis left lingula segment.  - There is a stable prominent right axillary lymph node measuring transversely 16 x 16 mm. - Grossly there is again dense patchy calcification the aorta and lesser calcification the coronary vasculature. The pulmonary arterial vasculature is again prominent suggesting pulmonary hypertension.  CT Chest on 09/17/2024 ([facility]): - Stable postsurgical changes in right lung.  - There are persistent subpleural patchy densities in lateral aspect of right lung base noted previously, perhaps lingering atelectasis. - New subpleural small patchy groundglass density is seen along posterior medial SANDY against the spine (2:27), may represent transient atelectasis.  - Another small subpleural groundglass density is seen in posterior lateral left lung base (2:82), probably similar process. - Stable prominent right axillary node, now measuring about 13 x 14 mm.   She presents today for 6 months follow up. Pt reports well, did have a cold about 2-3

## 2024-09-25 NOTE — ASSESSMENT
[FreeTextEntry1] : Ms. MONI BENAVIDEZ, 79 year old female, former smoker (1 PPD x 50 years, quit 7/17/2023); started cessation treatment June, 2023, now down to 2 cigs daily. On Varenicline. Pmhx of HTN, diverticular disease, osteoporosis. CT revealing multiple lung nodule.  Now s/p Bronchoscopy; right video thoracoscopy; robotic assisted right upper lobectomy with lymph node dissection, hilar and mediastinal on 07/17/2023. Path of RUL reveals adenocarcinoma, solid predominant, with acinar component, spanning 1.8 cm in greatest dimension. Lymphatic invasion is identified. The bronchial margin is negative for tumor and the nearest margin is 3.1 cm. [id number] (Stage IA2). Path of LNs level 4, 7, 8, 10, 11 Negative for carcinoma.  CT reviewed, some ggos, likely inflammatory, RTC in 6 mons with CT Chest w/o contrast   I, KERI Palacio, personally performed the evaluation and management (E/M) services for this established patient who presents today with (a) new problem(s)/exacerbation of (an) existing condition(s).  That E/M includes conducting the examination, assessing all new/exacerbated conditions, and establishing a new plan of care.  Today, my ACP, SUSAN Hodge was here to observe my evaluation and management services for this new problem/exacerbated condition to be followed going forward.

## 2024-09-25 NOTE — ASSESSMENT
[FreeTextEntry1] : Ms. MONI BENAVIDEZ, 79 year old female, former smoker (1 PPD x 50 years, quit 7/17/2023); started cessation treatment June, 2023, now down to 2 cigs daily. On Varenicline. Pmhx of HTN, diverticular disease, osteoporosis. CT revealing multiple lung nodule.  Now s/p Bronchoscopy; right video thoracoscopy; robotic assisted right upper lobectomy with lymph node dissection, hilar and mediastinal on 07/17/2023. Path of RUL reveals adenocarcinoma, solid predominant, with acinar component, spanning 1.8 cm in greatest dimension. Lymphatic invasion is identified. The bronchial margin is negative for tumor and the nearest margin is 3.1 cm. [id number] (Stage IA2). Path of LNs level 4, 7, 8, 10, 11 Negative for carcinoma.  CT reviewed, some ggos, likely inflammatory, RTC in 6 mons with CT Chest w/o contrast   I, KERI Palacio, personally performed the evaluation and management (E/M) services for this established patient who presents today with (a) new problem(s)/exacerbation of (an) existing condition(s).  That E/M includes conducting the examination, assessing all new/exacerbated conditions, and establishing a new plan of care.  Today, my ACP, SUSAN Hodge was here to observe my evaluation and management services for this new problem/exacerbated condition to be followed going forward.

## 2024-09-25 NOTE — HISTORY OF PRESENT ILLNESS
[FreeTextEntry1] : Ms. MONI BENAVIDEZ, 79 year old female, former smoker (1 PPD x 50 years, quit 7/17/2023); started cessation treatment June, 2023, now down to 2 cigs daily. On Varenicline. Pmhx of HTN, diverticular disease, osteoporosis. Recent CT revealing multiple lung nodule.  PET/CT on 06/05/2023 ([facility]): - There is an intensely FDG avid right upper lobe mass (SUV 9.3, 1.6 x 1.9 cm, series 3 image 79). This is suspicious for malignancy. - There are atelectatic changes in the anterior right upper lobe and the lingula without corresponding FDG activity. - There is an FDG avid right axillary lymph node (SUV 1.9, 1.5 cm, series 3 image 74), likely reactive in nature.  PFTs on 06/08/2023: FVC 2.07, 68%; FEV1 1.49, 64%; DLCO 17.64, 79%  Now s/p Bronchoscopy; right video thoracoscopy; robotic assisted right upper lobectomy with lymph node dissection, hilar and mediastinal on 07/17/2023. Path of RUL reveals adenocarcinoma, solid predominant, with acinar component, spanning 1.8 cm in greatest dimension. Lymphatic invasion is identified. The bronchial margin is negative for tumor and the nearest margin is 3.1 cm. [id number] (Stage IA2). Path of LNs level 4, 7, 8, 10, 11 Negative for carcinoma.  Post op course complicated by persistent air leak. Chest tube subsequently removed on POD 5  Seen on 09/06/2023: Recommend pt to return to office in 6 weeks with CT w/o contrast. pt complaint about increased sensation, discussed gabapentin but patient declined. recommended warm compress to area.  CT Chest on 09/28/2023 ([facility]): - There has been interval performance of right upper lobectomy. There is bronchial thickening, without suspicious nodule at this time.  CT Chest on 03/21/2024 ([facility]): - Patient is again noted to be status post right upper lobectomy with volume loss and scarring.  - New patchy parenchymal densities however in the lateral right costophrenic sulcus attributable to inflammatory nature.  - There again slight elements of linear atelectasis left lingula segment.  - There is a stable prominent right axillary lymph node measuring transversely 16 x 16 mm. - Grossly there is again dense patchy calcification the aorta and lesser calcification the coronary vasculature. The pulmonary arterial vasculature is again prominent suggesting pulmonary hypertension.  CT Chest on 09/17/2024 ([facility]): - Stable postsurgical changes in right lung.  - There are persistent subpleural patchy densities in lateral aspect of right lung base noted previously, perhaps lingering atelectasis. - New subpleural small patchy groundglass density is seen along posterior medial SANDY against the spine (2:27), may represent transient atelectasis.  - Another small subpleural groundglass density is seen in posterior lateral left lung base (2:82), probably similar process. - Stable prominent right axillary node, now measuring about 13 x 14 mm.   She presents today for 6 months follow up. Pt reports well, did have a cold about 2-3

## 2024-12-19 ENCOUNTER — APPOINTMENT (OUTPATIENT)
Dept: SURGERY | Facility: CLINIC | Age: 79
End: 2024-12-19
Payer: COMMERCIAL

## 2024-12-19 VITALS
RESPIRATION RATE: 16 BRPM | OXYGEN SATURATION: 95 % | TEMPERATURE: 98 F | WEIGHT: 240 LBS | SYSTOLIC BLOOD PRESSURE: 112 MMHG | HEART RATE: 86 BPM | DIASTOLIC BLOOD PRESSURE: 80 MMHG | HEIGHT: 67 IN | BODY MASS INDEX: 37.67 KG/M2

## 2024-12-19 DIAGNOSIS — Z12.39 ENCOUNTER FOR OTHER SCREENING FOR MALIGNANT NEOPLASM OF BREAST: ICD-10-CM

## 2024-12-19 DIAGNOSIS — N60.19 DIFFUSE CYSTIC MASTOPATHY OF UNSPECIFIED BREAST: ICD-10-CM

## 2024-12-19 PROCEDURE — 99213 OFFICE O/P EST LOW 20 MIN: CPT

## 2025-03-19 ENCOUNTER — APPOINTMENT (OUTPATIENT)
Dept: THORACIC SURGERY | Facility: CLINIC | Age: 80
End: 2025-03-19
Payer: COMMERCIAL

## 2025-03-19 ENCOUNTER — NON-APPOINTMENT (OUTPATIENT)
Age: 80
End: 2025-03-19

## 2025-03-19 VITALS
OXYGEN SATURATION: 90 % | WEIGHT: 250 LBS | RESPIRATION RATE: 16 BRPM | DIASTOLIC BLOOD PRESSURE: 66 MMHG | HEART RATE: 91 BPM | HEIGHT: 67 IN | BODY MASS INDEX: 39.24 KG/M2 | SYSTOLIC BLOOD PRESSURE: 118 MMHG

## 2025-03-19 DIAGNOSIS — C34.90 MALIGNANT NEOPLASM OF UNSPECIFIED PART OF UNSPECIFIED BRONCHUS OR LUNG: ICD-10-CM

## 2025-03-19 DIAGNOSIS — R91.1 SOLITARY PULMONARY NODULE: ICD-10-CM

## 2025-03-19 PROCEDURE — 99214 OFFICE O/P EST MOD 30 MIN: CPT

## 2025-06-05 ENCOUNTER — APPOINTMENT (OUTPATIENT)
Dept: SURGERY | Facility: CLINIC | Age: 80
End: 2025-06-05
Payer: COMMERCIAL

## 2025-06-05 VITALS
SYSTOLIC BLOOD PRESSURE: 134 MMHG | HEART RATE: 89 BPM | HEIGHT: 67 IN | OXYGEN SATURATION: 94 % | DIASTOLIC BLOOD PRESSURE: 73 MMHG | RESPIRATION RATE: 14 BRPM | TEMPERATURE: 97.9 F | BODY MASS INDEX: 39.24 KG/M2 | WEIGHT: 250 LBS

## 2025-06-05 DIAGNOSIS — Z12.39 ENCOUNTER FOR OTHER SCREENING FOR MALIGNANT NEOPLASM OF BREAST: ICD-10-CM

## 2025-06-05 DIAGNOSIS — N60.19 DIFFUSE CYSTIC MASTOPATHY OF UNSPECIFIED BREAST: ICD-10-CM

## 2025-06-05 PROCEDURE — 99214 OFFICE O/P EST MOD 30 MIN: CPT

## (undated) DEVICE — TROCAR COVIDIEN VERSAPORT BLADELESS OPTICAL 15MM STANDARD

## (undated) DEVICE — SUT VICRYL 0 27" UR-6

## (undated) DEVICE — XI ARM NEEDLE DRIVER MEGA

## (undated) DEVICE — DRSG TEGADERM 2.5X3"

## (undated) DEVICE — CHEST DRAIN PLEUR-EVAC DRY/WET ADULT-PEDS SINGLE (QUICK)

## (undated) DEVICE — XI DRAPE COLUMN

## (undated) DEVICE — ENDOCATCH GENERAL 15MM (PURPLE)

## (undated) DEVICE — LIJ-ESU BOVIE MACHINE - ROBOTIC 11483366: Type: DURABLE MEDICAL EQUIPMENT

## (undated) DEVICE — TROCAR COVIDIEN VERSASTEP PLUS 15MM STANDARD

## (undated) DEVICE — FOLEY TRAY 16FR 5CC LTX UMETER CLOSED

## (undated) DEVICE — CHEST DRAIN OASIS DRY SUCTION WATER SEAL

## (undated) DEVICE — STAPLER COVIDIEN ENDO GIA STANDARD HANDLE

## (undated) DEVICE — DRSG CURITY GAUZE SPONGE 4 X 4" 12-PLY

## (undated) DEVICE — ELCTR BOVIE TIP BLADE INSULATED 2.75" EDGE

## (undated) DEVICE — XI ARM CLIP APPLIER MEDIUM-LARGE

## (undated) DEVICE — XI ARM CLIP APPLIER SMALL

## (undated) DEVICE — D HELP - CLEARVIEW CLEARIFY SYSTEM

## (undated) DEVICE — BLADE SURGICAL #10 STAINLESS

## (undated) DEVICE — ENDOCATCH GENERAL 10MM (PURPLE)

## (undated) DEVICE — XI ARM GRASPER TIP UP FENESTRATED

## (undated) DEVICE — DRSG GAUZE PACKTNER ROLL

## (undated) DEVICE — SUT POLYSORB 4-0 P-12 UNDYED

## (undated) DEVICE — NDL HYPO REGULAR BEVEL 22G X 1.5" (TURQUOISE)

## (undated) DEVICE — XI ARM FORCEP FENESTRATED BIPOLAR 8MM

## (undated) DEVICE — TUBING SUCTION 20FT

## (undated) DEVICE — BLADE SURGICAL #15 CARBON

## (undated) DEVICE — XI SEAL UNIV 5- 8 MM

## (undated) DEVICE — WARMING BLANKET UPPER ADULT

## (undated) DEVICE — GRASPER LAPA 5MMX35CM

## (undated) DEVICE — POSITIONER FOAM HEAD CRADLE (PINK)

## (undated) DEVICE — VENODYNE/SCD SLEEVE CALF MEDIUM

## (undated) DEVICE — SUT PDS II 2-0 27" SH

## (undated) DEVICE — SUT MONOCRYL 4-0 27" PS-2 UNDYED

## (undated) DEVICE — XI ARM GRASPER BIPOLAR LONG 8MM

## (undated) DEVICE — XI OBTURATOR OPTICAL BLADELESS 8MM

## (undated) DEVICE — XI DRAPE ARM

## (undated) DEVICE — WARMING BLANKET LOWER ADULT

## (undated) DEVICE — TUBING STRYKEFLOW II SUCTION / IRRIGATOR

## (undated) DEVICE — DRAPE INSTRUMENT POUCH 6.75" X 11"

## (undated) DEVICE — SOL IRR POUR NS 0.9% 500ML

## (undated) DEVICE — ELCTR BOVIE PENCIL SMOKE EVACUATION

## (undated) DEVICE — XI ARM FORCEP CADIERE 8MM

## (undated) DEVICE — PACK ROBOTIC LIJ

## (undated) DEVICE — SUT POLYSORB 2-0 30" V-20 UNDYED

## (undated) DEVICE — GOWN XL

## (undated) DEVICE — XI ARM PERMANENT CAUTERY SPATULA

## (undated) DEVICE — SPECIMEN CONTAINER 100ML

## (undated) DEVICE — TUBING OLYMPUS INSUFFLATION

## (undated) DEVICE — SUT SURGIPRO 0 30" GS-22

## (undated) DEVICE — SUT SILK 0 24" SH DA